# Patient Record
Sex: MALE | Race: WHITE | Employment: OTHER | URBAN - METROPOLITAN AREA
[De-identification: names, ages, dates, MRNs, and addresses within clinical notes are randomized per-mention and may not be internally consistent; named-entity substitution may affect disease eponyms.]

---

## 2017-01-10 ENCOUNTER — NURSE ONLY (OUTPATIENT)
Dept: CARDIOLOGY CLINIC | Age: 82
End: 2017-01-10

## 2017-01-10 ENCOUNTER — OFFICE VISIT (OUTPATIENT)
Dept: INTERNAL MEDICINE CLINIC | Age: 82
End: 2017-01-10

## 2017-01-10 VITALS
HEART RATE: 65 BPM | SYSTOLIC BLOOD PRESSURE: 128 MMHG | DIASTOLIC BLOOD PRESSURE: 70 MMHG | HEIGHT: 65 IN | WEIGHT: 208 LBS | BODY MASS INDEX: 34.66 KG/M2

## 2017-01-10 VITALS
WEIGHT: 207 LBS | HEART RATE: 65 BPM | OXYGEN SATURATION: 94 % | BODY MASS INDEX: 34.49 KG/M2 | HEIGHT: 65 IN | RESPIRATION RATE: 14 BRPM | DIASTOLIC BLOOD PRESSURE: 62 MMHG | SYSTOLIC BLOOD PRESSURE: 115 MMHG

## 2017-01-10 DIAGNOSIS — E11.51 TYPE 2 DIABETES MELLITUS WITH DIABETIC PERIPHERAL ANGIOPATHY WITHOUT GANGRENE, WITH LONG-TERM CURRENT USE OF INSULIN (HCC): ICD-10-CM

## 2017-01-10 DIAGNOSIS — I73.9 PVD (PERIPHERAL VASCULAR DISEASE) (HCC): ICD-10-CM

## 2017-01-10 DIAGNOSIS — Z95.0 CARDIAC PACEMAKER IN SITU: ICD-10-CM

## 2017-01-10 DIAGNOSIS — Z00.00 ROUTINE HEALTH MAINTENANCE: ICD-10-CM

## 2017-01-10 DIAGNOSIS — I10 ESSENTIAL HYPERTENSION: Primary | ICD-10-CM

## 2017-01-10 DIAGNOSIS — Z95.0 CARDIAC PACEMAKER IN SITU: Primary | ICD-10-CM

## 2017-01-10 DIAGNOSIS — E11.42 DIABETIC POLYNEUROPATHY ASSOCIATED WITH TYPE 2 DIABETES MELLITUS (HCC): ICD-10-CM

## 2017-01-10 DIAGNOSIS — I25.810 CORONARY ARTERY DISEASE INVOLVING CORONARY BYPASS GRAFT OF NATIVE HEART WITHOUT ANGINA PECTORIS: ICD-10-CM

## 2017-01-10 DIAGNOSIS — E78.00 PURE HYPERCHOLESTEROLEMIA: ICD-10-CM

## 2017-01-10 DIAGNOSIS — Z79.4 TYPE 2 DIABETES MELLITUS WITH DIABETIC PERIPHERAL ANGIOPATHY WITHOUT GANGRENE, WITH LONG-TERM CURRENT USE OF INSULIN (HCC): ICD-10-CM

## 2017-01-10 DIAGNOSIS — I48.0 PAF (PAROXYSMAL ATRIAL FIBRILLATION) (HCC): ICD-10-CM

## 2017-01-10 DIAGNOSIS — N40.0 BENIGN PROSTATIC HYPERPLASIA WITHOUT LOWER URINARY TRACT SYMPTOMS, UNSPECIFIED MORPHOLOGY: ICD-10-CM

## 2017-01-10 PROCEDURE — G8484 FLU IMMUNIZE NO ADMIN: HCPCS | Performed by: INTERNAL MEDICINE

## 2017-01-10 PROCEDURE — 99204 OFFICE O/P NEW MOD 45 MIN: CPT | Performed by: INTERNAL MEDICINE

## 2017-01-10 PROCEDURE — 4040F PNEUMOC VAC/ADMIN/RCVD: CPT | Performed by: INTERNAL MEDICINE

## 2017-01-10 PROCEDURE — 1036F TOBACCO NON-USER: CPT | Performed by: INTERNAL MEDICINE

## 2017-01-10 PROCEDURE — 1123F ACP DISCUSS/DSCN MKR DOCD: CPT | Performed by: INTERNAL MEDICINE

## 2017-01-10 PROCEDURE — G8419 CALC BMI OUT NRM PARAM NOF/U: HCPCS | Performed by: INTERNAL MEDICINE

## 2017-01-10 PROCEDURE — G8598 ASA/ANTIPLAT THER USED: HCPCS | Performed by: INTERNAL MEDICINE

## 2017-01-10 PROCEDURE — 93280 PM DEVICE PROGR EVAL DUAL: CPT | Performed by: INTERNAL MEDICINE

## 2017-01-10 PROCEDURE — G8427 DOCREV CUR MEDS BY ELIG CLIN: HCPCS | Performed by: INTERNAL MEDICINE

## 2017-01-10 RX ORDER — GABAPENTIN 300 MG/1
300 CAPSULE ORAL 3 TIMES DAILY
COMMUNITY
End: 2017-01-10 | Stop reason: SDUPTHER

## 2017-01-10 RX ORDER — LISINOPRIL 5 MG/1
5 TABLET ORAL DAILY
Qty: 90 TABLET | Refills: 2 | Status: CANCELLED | OUTPATIENT
Start: 2017-01-10

## 2017-01-11 PROBLEM — N40.0 BENIGN PROSTATIC HYPERPLASIA WITHOUT LOWER URINARY TRACT SYMPTOMS: Status: ACTIVE | Noted: 2017-01-11

## 2017-01-11 PROBLEM — I25.810 CORONARY ARTERY DISEASE INVOLVING CORONARY BYPASS GRAFT OF NATIVE HEART WITHOUT ANGINA PECTORIS: Status: ACTIVE | Noted: 2017-01-11

## 2017-01-11 RX ORDER — SIMVASTATIN 20 MG
20 TABLET ORAL NIGHTLY
Qty: 90 TABLET | Refills: 2 | Status: SHIPPED | OUTPATIENT
Start: 2017-01-11

## 2017-01-11 RX ORDER — CHOLECALCIFEROL (VITAMIN D3) 25 MCG
2000 CAPSULE ORAL DAILY
Qty: 180 CAPSULE | Refills: 2 | Status: SHIPPED | OUTPATIENT
Start: 2017-01-11

## 2017-01-11 RX ORDER — GABAPENTIN 300 MG/1
300 CAPSULE ORAL 3 TIMES DAILY
Qty: 270 CAPSULE | Refills: 2 | Status: SHIPPED | OUTPATIENT
Start: 2017-01-11 | End: 2017-02-02 | Stop reason: DRUGHIGH

## 2017-01-11 RX ORDER — CILOSTAZOL 100 MG/1
100 TABLET ORAL 2 TIMES DAILY
Qty: 180 TABLET | Refills: 2 | Status: SHIPPED | OUTPATIENT
Start: 2017-01-11

## 2017-01-11 RX ORDER — ACETAMINOPHEN 325 MG/1
650 TABLET ORAL EVERY 6 HOURS PRN
Qty: 120 TABLET | Refills: 3 | Status: SHIPPED | OUTPATIENT
Start: 2017-01-11 | End: 2017-02-04

## 2017-01-11 RX ORDER — CHLORAL HYDRATE 500 MG
1000 CAPSULE ORAL EVERY MORNING
Qty: 90 CAPSULE | Refills: 3 | Status: SHIPPED | OUTPATIENT
Start: 2017-01-11 | End: 2018-02-07 | Stop reason: ALTCHOICE

## 2017-01-11 RX ORDER — METOPROLOL TARTRATE 50 MG/1
50 TABLET, FILM COATED ORAL 2 TIMES DAILY
Qty: 180 TABLET | Refills: 2 | Status: SHIPPED | OUTPATIENT
Start: 2017-01-11 | End: 2017-11-02

## 2017-01-11 RX ORDER — LISINOPRIL 10 MG/1
10 TABLET ORAL DAILY
Qty: 90 TABLET | Refills: 2 | Status: SHIPPED | OUTPATIENT
Start: 2017-01-11 | End: 2017-04-12 | Stop reason: DRUGHIGH

## 2017-01-11 RX ORDER — ASPIRIN 81 MG/1
81 TABLET ORAL EVERY MORNING
Qty: 90 TABLET | Refills: 2 | Status: SHIPPED | OUTPATIENT
Start: 2017-01-11 | End: 2019-02-01

## 2017-01-11 RX ORDER — GLIPIZIDE 5 MG/1
10 TABLET ORAL
Qty: 180 TABLET | Refills: 2 | Status: SHIPPED | OUTPATIENT
Start: 2017-01-11 | End: 2017-05-10 | Stop reason: SDUPTHER

## 2017-01-11 RX ORDER — INSULIN GLARGINE 100 [IU]/ML
20 INJECTION, SOLUTION SUBCUTANEOUS NIGHTLY
Qty: 10 VIAL | Refills: 2 | Status: SHIPPED | OUTPATIENT
Start: 2017-01-11

## 2017-01-11 RX ORDER — TAMSULOSIN HYDROCHLORIDE 0.4 MG/1
0.4 CAPSULE ORAL DAILY
Qty: 90 CAPSULE | Refills: 2 | Status: SHIPPED | OUTPATIENT
Start: 2017-01-11

## 2017-02-02 ENCOUNTER — OFFICE VISIT (OUTPATIENT)
Dept: INTERNAL MEDICINE CLINIC | Age: 82
End: 2017-02-02

## 2017-02-02 VITALS
OXYGEN SATURATION: 96 % | HEIGHT: 64 IN | SYSTOLIC BLOOD PRESSURE: 130 MMHG | RESPIRATION RATE: 12 BRPM | BODY MASS INDEX: 34.15 KG/M2 | WEIGHT: 200 LBS | HEART RATE: 60 BPM | DIASTOLIC BLOOD PRESSURE: 60 MMHG

## 2017-02-02 DIAGNOSIS — E78.00 PURE HYPERCHOLESTEROLEMIA: ICD-10-CM

## 2017-02-02 DIAGNOSIS — I10 ESSENTIAL HYPERTENSION: ICD-10-CM

## 2017-02-02 DIAGNOSIS — Z95.0 CARDIAC PACEMAKER IN SITU: ICD-10-CM

## 2017-02-02 DIAGNOSIS — Z79.4 TYPE 2 DIABETES MELLITUS WITH DIABETIC PERIPHERAL ANGIOPATHY WITHOUT GANGRENE, WITH LONG-TERM CURRENT USE OF INSULIN (HCC): ICD-10-CM

## 2017-02-02 DIAGNOSIS — I25.810 CORONARY ARTERY DISEASE INVOLVING CORONARY BYPASS GRAFT OF NATIVE HEART WITHOUT ANGINA PECTORIS: ICD-10-CM

## 2017-02-02 DIAGNOSIS — R42 DIZZINESS: Primary | ICD-10-CM

## 2017-02-02 DIAGNOSIS — E11.51 TYPE 2 DIABETES MELLITUS WITH DIABETIC PERIPHERAL ANGIOPATHY WITHOUT GANGRENE, WITH LONG-TERM CURRENT USE OF INSULIN (HCC): ICD-10-CM

## 2017-02-02 DIAGNOSIS — E11.42 DIABETIC POLYNEUROPATHY ASSOCIATED WITH TYPE 2 DIABETES MELLITUS (HCC): ICD-10-CM

## 2017-02-02 PROCEDURE — 99214 OFFICE O/P EST MOD 30 MIN: CPT | Performed by: INTERNAL MEDICINE

## 2017-02-02 RX ORDER — GABAPENTIN 300 MG/1
300 CAPSULE ORAL NIGHTLY
Qty: 270 CAPSULE | Refills: 2 | Status: SHIPPED | OUTPATIENT
Start: 2017-02-02

## 2017-02-06 ENCOUNTER — TELEPHONE (OUTPATIENT)
Dept: INTERNAL MEDICINE CLINIC | Age: 82
End: 2017-02-06

## 2017-02-06 DIAGNOSIS — H81.10 BPV (BENIGN POSITIONAL VERTIGO), UNSPECIFIED LATERALITY: Primary | ICD-10-CM

## 2017-03-02 ENCOUNTER — TELEPHONE (OUTPATIENT)
Dept: INTERNAL MEDICINE CLINIC | Age: 82
End: 2017-03-02

## 2017-03-08 RX ORDER — PEN NEEDLE, DIABETIC 30 GX5/16"
1 NEEDLE, DISPOSABLE MISCELLANEOUS DAILY
Qty: 100 EACH | Refills: 3 | Status: SHIPPED | OUTPATIENT
Start: 2017-03-08

## 2017-03-08 RX ORDER — PEN NEEDLE, DIABETIC 30 GX5/16"
1 NEEDLE, DISPOSABLE MISCELLANEOUS DAILY
COMMUNITY
End: 2017-03-08 | Stop reason: SDUPTHER

## 2017-04-12 ENCOUNTER — OFFICE VISIT (OUTPATIENT)
Dept: CARDIOLOGY CLINIC | Age: 82
End: 2017-04-12

## 2017-04-12 ENCOUNTER — PROCEDURE VISIT (OUTPATIENT)
Dept: CARDIOLOGY CLINIC | Age: 82
End: 2017-04-12

## 2017-04-12 VITALS
WEIGHT: 197 LBS | DIASTOLIC BLOOD PRESSURE: 70 MMHG | HEIGHT: 66 IN | BODY MASS INDEX: 31.66 KG/M2 | HEART RATE: 60 BPM | SYSTOLIC BLOOD PRESSURE: 110 MMHG

## 2017-04-12 VITALS — HEART RATE: 68 BPM | SYSTOLIC BLOOD PRESSURE: 110 MMHG | DIASTOLIC BLOOD PRESSURE: 70 MMHG

## 2017-04-12 DIAGNOSIS — Z95.0 S/P PLACEMENT OF CARDIAC PACEMAKER: ICD-10-CM

## 2017-04-12 DIAGNOSIS — I25.810 CORONARY ARTERY DISEASE INVOLVING CORONARY BYPASS GRAFT OF NATIVE HEART WITHOUT ANGINA PECTORIS: Primary | ICD-10-CM

## 2017-04-12 DIAGNOSIS — Z95.1 S/P CABG X 3: ICD-10-CM

## 2017-04-12 DIAGNOSIS — Z95.0 CARDIAC PACEMAKER IN SITU: Primary | ICD-10-CM

## 2017-04-12 DIAGNOSIS — I73.9 PAD (PERIPHERAL ARTERY DISEASE) (HCC): ICD-10-CM

## 2017-04-12 DIAGNOSIS — Z95.0 PACEMAKER: ICD-10-CM

## 2017-04-12 DIAGNOSIS — Z79.4 TYPE 2 DIABETES MELLITUS WITH DIABETIC PERIPHERAL ANGIOPATHY WITHOUT GANGRENE, WITH LONG-TERM CURRENT USE OF INSULIN (HCC): ICD-10-CM

## 2017-04-12 DIAGNOSIS — E78.00 PURE HYPERCHOLESTEROLEMIA: ICD-10-CM

## 2017-04-12 DIAGNOSIS — E11.51 TYPE 2 DIABETES MELLITUS WITH DIABETIC PERIPHERAL ANGIOPATHY WITHOUT GANGRENE, WITH LONG-TERM CURRENT USE OF INSULIN (HCC): ICD-10-CM

## 2017-04-12 DIAGNOSIS — I48.0 PAF (PAROXYSMAL ATRIAL FIBRILLATION) (HCC): ICD-10-CM

## 2017-04-12 DIAGNOSIS — Z13.1 DM (DIABETES MELLITUS SCREEN): ICD-10-CM

## 2017-04-12 DIAGNOSIS — I10 ESSENTIAL HYPERTENSION: ICD-10-CM

## 2017-04-12 PROCEDURE — G8598 ASA/ANTIPLAT THER USED: HCPCS | Performed by: INTERNAL MEDICINE

## 2017-04-12 PROCEDURE — 1123F ACP DISCUSS/DSCN MKR DOCD: CPT | Performed by: INTERNAL MEDICINE

## 2017-04-12 PROCEDURE — 99214 OFFICE O/P EST MOD 30 MIN: CPT | Performed by: INTERNAL MEDICINE

## 2017-04-12 PROCEDURE — 93280 PM DEVICE PROGR EVAL DUAL: CPT | Performed by: INTERNAL MEDICINE

## 2017-04-12 PROCEDURE — 1036F TOBACCO NON-USER: CPT | Performed by: INTERNAL MEDICINE

## 2017-04-12 PROCEDURE — G8419 CALC BMI OUT NRM PARAM NOF/U: HCPCS | Performed by: INTERNAL MEDICINE

## 2017-04-12 PROCEDURE — 4040F PNEUMOC VAC/ADMIN/RCVD: CPT | Performed by: INTERNAL MEDICINE

## 2017-04-12 PROCEDURE — G8427 DOCREV CUR MEDS BY ELIG CLIN: HCPCS | Performed by: INTERNAL MEDICINE

## 2017-04-12 RX ORDER — LISINOPRIL 5 MG/1
5 TABLET ORAL DAILY
COMMUNITY
End: 2017-05-10 | Stop reason: SDUPTHER

## 2017-05-10 DIAGNOSIS — E11.51 TYPE 2 DIABETES MELLITUS WITH DIABETIC PERIPHERAL ANGIOPATHY WITHOUT GANGRENE, WITH LONG-TERM CURRENT USE OF INSULIN (HCC): ICD-10-CM

## 2017-05-10 DIAGNOSIS — Z79.4 TYPE 2 DIABETES MELLITUS WITH DIABETIC PERIPHERAL ANGIOPATHY WITHOUT GANGRENE, WITH LONG-TERM CURRENT USE OF INSULIN (HCC): ICD-10-CM

## 2017-05-11 ENCOUNTER — TELEPHONE (OUTPATIENT)
Dept: INTERNAL MEDICINE CLINIC | Age: 82
End: 2017-05-11

## 2017-05-12 RX ORDER — LISINOPRIL 5 MG/1
5 TABLET ORAL DAILY
Qty: 30 TABLET | Refills: 3 | Status: SHIPPED | OUTPATIENT
Start: 2017-05-12 | End: 2017-05-16 | Stop reason: SDUPTHER

## 2017-05-12 RX ORDER — GLIPIZIDE 5 MG/1
10 TABLET ORAL
Qty: 180 TABLET | Refills: 2 | Status: SHIPPED | OUTPATIENT
Start: 2017-05-12 | End: 2017-05-17 | Stop reason: SDUPTHER

## 2017-05-16 DIAGNOSIS — I10 ESSENTIAL HYPERTENSION: Primary | ICD-10-CM

## 2017-05-16 RX ORDER — LISINOPRIL 5 MG/1
5 TABLET ORAL DAILY
Qty: 90 TABLET | Refills: 1 | Status: ON HOLD | OUTPATIENT
Start: 2017-05-16 | End: 2019-12-31 | Stop reason: HOSPADM

## 2017-05-17 DIAGNOSIS — Z79.4 TYPE 2 DIABETES MELLITUS WITH DIABETIC PERIPHERAL ANGIOPATHY WITHOUT GANGRENE, WITH LONG-TERM CURRENT USE OF INSULIN (HCC): ICD-10-CM

## 2017-05-17 DIAGNOSIS — E11.51 TYPE 2 DIABETES MELLITUS WITH DIABETIC PERIPHERAL ANGIOPATHY WITHOUT GANGRENE, WITH LONG-TERM CURRENT USE OF INSULIN (HCC): ICD-10-CM

## 2017-05-17 RX ORDER — GLIPIZIDE 5 MG/1
10 TABLET ORAL
Qty: 360 TABLET | Refills: 1 | Status: SHIPPED | OUTPATIENT
Start: 2017-05-17

## 2017-07-19 ENCOUNTER — NURSE ONLY (OUTPATIENT)
Dept: CARDIOLOGY CLINIC | Age: 82
End: 2017-07-19

## 2017-07-19 VITALS — DIASTOLIC BLOOD PRESSURE: 68 MMHG | HEART RATE: 60 BPM | SYSTOLIC BLOOD PRESSURE: 118 MMHG

## 2017-07-19 DIAGNOSIS — Z95.0 CARDIAC PACEMAKER IN SITU: Primary | ICD-10-CM

## 2017-07-19 PROCEDURE — 93280 PM DEVICE PROGR EVAL DUAL: CPT | Performed by: INTERNAL MEDICINE

## 2017-10-25 ENCOUNTER — PROCEDURE VISIT (OUTPATIENT)
Dept: CARDIOLOGY CLINIC | Age: 82
End: 2017-10-25

## 2017-10-25 ENCOUNTER — OFFICE VISIT (OUTPATIENT)
Dept: CARDIOLOGY CLINIC | Age: 82
End: 2017-10-25

## 2017-10-25 VITALS — HEART RATE: 64 BPM | DIASTOLIC BLOOD PRESSURE: 60 MMHG | SYSTOLIC BLOOD PRESSURE: 92 MMHG

## 2017-10-25 VITALS
HEART RATE: 64 BPM | WEIGHT: 204.6 LBS | SYSTOLIC BLOOD PRESSURE: 92 MMHG | DIASTOLIC BLOOD PRESSURE: 60 MMHG | BODY MASS INDEX: 32.88 KG/M2 | HEIGHT: 66 IN

## 2017-10-25 DIAGNOSIS — Z79.4 TYPE 2 DIABETES MELLITUS WITH DIABETIC PERIPHERAL ANGIOPATHY WITHOUT GANGRENE, WITH LONG-TERM CURRENT USE OF INSULIN (HCC): ICD-10-CM

## 2017-10-25 DIAGNOSIS — Z95.0 CARDIAC PACEMAKER IN SITU: Primary | ICD-10-CM

## 2017-10-25 DIAGNOSIS — I25.810 CORONARY ARTERY DISEASE INVOLVING CORONARY BYPASS GRAFT OF NATIVE HEART WITHOUT ANGINA PECTORIS: Primary | ICD-10-CM

## 2017-10-25 DIAGNOSIS — E11.51 TYPE 2 DIABETES MELLITUS WITH DIABETIC PERIPHERAL ANGIOPATHY WITHOUT GANGRENE, WITH LONG-TERM CURRENT USE OF INSULIN (HCC): ICD-10-CM

## 2017-10-25 DIAGNOSIS — Z95.0 S/P PLACEMENT OF CARDIAC PACEMAKER: ICD-10-CM

## 2017-10-25 DIAGNOSIS — I73.9 PAD (PERIPHERAL ARTERY DISEASE) (HCC): ICD-10-CM

## 2017-10-25 DIAGNOSIS — R42 DIZZINESS: ICD-10-CM

## 2017-10-25 DIAGNOSIS — E78.00 PURE HYPERCHOLESTEROLEMIA: ICD-10-CM

## 2017-10-25 DIAGNOSIS — Z95.1 S/P CABG X 3: ICD-10-CM

## 2017-10-25 DIAGNOSIS — I48.0 PAF (PAROXYSMAL ATRIAL FIBRILLATION) (HCC): ICD-10-CM

## 2017-10-25 DIAGNOSIS — I10 ESSENTIAL HYPERTENSION: ICD-10-CM

## 2017-10-25 PROCEDURE — 1036F TOBACCO NON-USER: CPT | Performed by: INTERNAL MEDICINE

## 2017-10-25 PROCEDURE — 4040F PNEUMOC VAC/ADMIN/RCVD: CPT | Performed by: INTERNAL MEDICINE

## 2017-10-25 PROCEDURE — G8484 FLU IMMUNIZE NO ADMIN: HCPCS | Performed by: INTERNAL MEDICINE

## 2017-10-25 PROCEDURE — 1123F ACP DISCUSS/DSCN MKR DOCD: CPT | Performed by: INTERNAL MEDICINE

## 2017-10-25 PROCEDURE — G8427 DOCREV CUR MEDS BY ELIG CLIN: HCPCS | Performed by: INTERNAL MEDICINE

## 2017-10-25 PROCEDURE — 93280 PM DEVICE PROGR EVAL DUAL: CPT | Performed by: INTERNAL MEDICINE

## 2017-10-25 PROCEDURE — G8417 CALC BMI ABV UP PARAM F/U: HCPCS | Performed by: INTERNAL MEDICINE

## 2017-10-25 PROCEDURE — 99214 OFFICE O/P EST MOD 30 MIN: CPT | Performed by: INTERNAL MEDICINE

## 2017-10-25 PROCEDURE — G8598 ASA/ANTIPLAT THER USED: HCPCS | Performed by: INTERNAL MEDICINE

## 2017-10-25 RX ORDER — SULFAMETHOXAZOLE AND TRIMETHOPRIM 800; 160 MG/1; MG/1
TABLET ORAL
COMMUNITY
Start: 2017-07-30 | End: 2018-07-01

## 2017-10-25 NOTE — PATIENT INSTRUCTIONS
CAD:Yes   clinically stable. Patient is on optimal medical regimen ( see medication list above )  -     CORONARY ARTERY DISEASE: Patient is currently  asymptomatic from CAD. - changes in  treatment:   no           - Testing ordered:  no  [facility] classification: 1  [place] RISK SCORE:  [place] RISK SCORE:  HTN:well controlled on current medical regimen, see list above. - changes in  treatment:   no      VHD: No significant VHD noted  DYSLIPIDEMIA: Patient's profile is at / near Goal.yes,                                 HDL is low                                Tolerating current medical regimen wellyes,                                                          See most recent Lab values in Labs section above. Diabetes mellitis: .yes,                                      Managed by family MD                                     BS under good control no                                     Hgb A1c avilable yes,   PAD: None known. claudication symptoms. .no  OTHER RELEVANT DIAGNOSIS:as noted in patient's active problem list:  TESTS ORDERED:  Echocardiogram & Lexiscan Cardiolite. All previously ordered tests reviewed. ARRHYTHMIAS: Patient has H/O P. Atrial fibrillation                                He is rate controlled & on anticoagulation. MEDICATIONS: CPM   Office f/u in six months. Primary/secondary prevention is the goal by aggressive risk modification, healthy and therapeutic life style changes for cardiovascular risk reduction. Various goals are discussed and multiple questions answered.

## 2017-10-25 NOTE — PROGRESS NOTES
MISC 1 each by Does not apply route daily 100 each 3    gabapentin (NEURONTIN) 300 MG capsule Take 1 capsule by mouth nightly 270 capsule 2    apixaban (ELIQUIS) 2.5 MG TABS tablet Take 1 tablet by mouth 2 times daily (Patient taking differently: Take 5 mg by mouth 2 times daily ) 180 tablet 0    cilostazol (PLETAL) 100 MG tablet Take 1 tablet by mouth 2 times daily 180 tablet 2    insulin glargine (LANTUS) 100 UNIT/ML injection vial Inject 20 Units into the skin nightly 10 vial 2    saxagliptin (ONGLYZA) 2.5 MG TABS tablet Take 2 tablets by mouth daily 180 tablet 2    metFORMIN (GLUCOPHAGE) 1000 MG tablet Take 0.5 tablets by mouth daily (with breakfast) 180 tablet 2    tamsulosin (FLOMAX) 0.4 MG capsule Take 1 capsule by mouth daily 90 capsule 2    Cholecalciferol (VITAMIN D-3) 1000 UNITS CAPS Take 2,000 Units by mouth daily 180 capsule 2    metoprolol tartrate (LOPRESSOR) 50 MG tablet Take 1 tablet by mouth 2 times daily 180 tablet 2    Omega-3 Fatty Acids (FISH OIL) 1000 MG CAPS Take 1 capsule by mouth every morning Over The Counter 90 capsule 3    simvastatin (ZOCOR) 20 MG tablet Take 1 tablet by mouth nightly 90 tablet 2    aspirin EC 81 MG EC tablet Take 1 tablet by mouth every morning Over The Counter, Last Dose Taken 12-13-13 Due To To Scheduled Surgery 90 tablet 2     No current facility-administered medications for this visit. Allergies: Pcn [penicillins];  Terramycin [oxytetracycline]; and Tetracyclines & related  Past Medical History:   Diagnosis Date    Acid reflux     Arthritis     Asthma     \"As A Child, No Problems\"    Back problem     \"Back Spasms Sometimes\"    CAD (coronary artery disease)     Sees Dr. Kaycee Howell    Diabetes mellitus [facility]) Dx 1980's    Full dentures     H/O 24 hour EKG monitoring 05/22/2014    14 DAY EVENT MONITOR    H/O Doppler ultrasound 10/25/11    <50% carotid disease bilaterally    H/O echocardiogram 10/11    EF 62%    H/O echocardiogram 11/24/14 Significant for aortic root and left arterial enlargement. Normal LV systolic but abnormal diastolic function. Mild tricuspid and aortic insufficiencies. Minimum pulmonary HTN.     Hiatal hernia     History of cardiovascular stress test     12/13 Normal EF52%, 10/11 moderate inferior wall myocardial ischemia    History of Holter monitoring 5/22/14    Event Monitor: Paced rhythm with PACs    HX OTHER MEDICAL     Primary Care Physician Is Dr. Patricia Neither     Resident At [address] [place] [address]     Hypertension     Kidney stones 1980's    \"Passed Kidney Stones\"    Neuropathy ([address]     \"Both Feet\"    Nocturia     [facility]     Palpitations 5/14    PVD (peripheral vascular disease) ([facility] [address] 2/11    mild    S/P CABG x 3 1998    Done [place] S/P left heart catheterization by percutaneous approach 6/28/10    patency of 2 grafts with possile occlusion of third    S/P placement of cardiac pacemaker 8-7-98, 1-7-11    Nirav Persaud DR Pacemaker Implanted    Slow urinary stream     Staph infection \"Twice In 2001\"    \"Left Knee After Surgery\"    Wears glasses      Past Surgical History:   Procedure Laterality Date    CARDIAC PACEMAKER PLACEMENT  8-7-98    Nirav Persaud DR Pacemaker Implanted    CARDIAC PACEMAKER PLACEMENT  1-7-11    Nirav Persaud DR Pacemaker Implanted   [address]    CABG ([address])    CHOLECYSTECTOMY, LAPAROSCOPIC  1999    COLONOSCOPY  \"Several\"    \"Removed Polyps Once\"    DENTAL SURGERY      All Teeth Extracted In Past    INGUINAL HERNIA REPAIR Left 1940   Lonnie Sergeant Right 1980's    JOINT REPLACEMENT Left 2001    Total Left Knee    JOINT REPLACEMENT Left 2001    \"Had Staph Infection , Took Implant Out, Replaced It After 8 Months\"    OTHER SURGICAL HISTORY Left 12/18/13    left spermatocelectomy, left scrotal exploration    PACEMAKER PLACEMENT  8-7-98    Nirav Persaud DR No jaundice, no conjunctival pallor. SKIN: It is warm & dry. No rashes. No Echhymosis    HEENT  No clinically significant abnormalities seen. Neck - Supple. No jugular venous distention noted. No carotid bruits. Cardiovascular  Normal S1 and S2 without obvious murmur or gallop. Extremities - No cyanosis, clubbing, or significant edema. Pulmonary  No respiratory distress. No wheezes or rales. Abdomen  No masses, tenderness, or organomegaly. Musculoskeletal  No significant edema. No joint deformities. No muscle wasting. Neurologic  Cranial nerves II through XII are grossly intact. There were no gross focal neurologic abnormalities. Lab Review   Lab Results   Component Value Date    TROPONINI 0.016 05/16/2014     BNP:    Lab Results   Component Value Date    BNP 28 05/16/2014     PT/INR:    Lab Results   Component Value Date    INR 1.18 01/31/2017     Lab Results   Component Value Date    LABA1C 7.5 (H) 01/31/2017    LABA1C 7.3 (H) 10/07/2016     Lab Results   Component Value Date    WBC 4.5 02/01/2017    HCT 33.0 (L) 02/01/2017    MCV 99.4 02/01/2017     (L) 02/01/2017     Lab Results   Component Value Date    CHOL 105 10/07/2016    TRIG 66 10/07/2016    HDL 49 10/07/2016    LDLCALC 43 10/07/2016    LDLDIRECT 49 07/12/2016     Lab Results   Component Value Date    ALT 10 01/31/2017    AST 16 01/31/2017     BMP:    Lab Results   Component Value Date     02/01/2017    K 4.1 02/01/2017     02/01/2017    CO2 28 02/01/2017    BUN 25 02/01/2017    CREATININE 1.2 02/01/2017     CMP:   Lab Results   Component Value Date     02/01/2017    K 4.1 02/01/2017     02/01/2017    CO2 28 02/01/2017    BUN 25 02/01/2017    PROT 6.1 01/31/2017    PROT 6.1 11/13/2012     TSH:  No results found for: TSH    QUALITY MEASURES REVIEWED:  1.CAD:Patient is taking anti platelet agent:Yes  2. DYSLIPIDEMIA: Patient is on cholesterol lowering medication:Yes  3. Beta-Blocker therapy for CAD, if

## 2017-11-01 ENCOUNTER — PROCEDURE VISIT (OUTPATIENT)
Dept: CARDIOLOGY CLINIC | Age: 82
End: 2017-11-01

## 2017-11-01 DIAGNOSIS — I10 ESSENTIAL HYPERTENSION: ICD-10-CM

## 2017-11-01 DIAGNOSIS — R42 DIZZINESS: ICD-10-CM

## 2017-11-01 DIAGNOSIS — Z95.0 S/P PLACEMENT OF CARDIAC PACEMAKER: ICD-10-CM

## 2017-11-01 DIAGNOSIS — E78.00 PURE HYPERCHOLESTEROLEMIA: ICD-10-CM

## 2017-11-01 DIAGNOSIS — I25.810 CORONARY ARTERY DISEASE INVOLVING CORONARY BYPASS GRAFT OF NATIVE HEART WITHOUT ANGINA PECTORIS: ICD-10-CM

## 2017-11-01 DIAGNOSIS — Z79.4 TYPE 2 DIABETES MELLITUS WITH DIABETIC PERIPHERAL ANGIOPATHY WITHOUT GANGRENE, WITH LONG-TERM CURRENT USE OF INSULIN (HCC): ICD-10-CM

## 2017-11-01 DIAGNOSIS — I73.9 PAD (PERIPHERAL ARTERY DISEASE) (HCC): ICD-10-CM

## 2017-11-01 DIAGNOSIS — E11.51 TYPE 2 DIABETES MELLITUS WITH DIABETIC PERIPHERAL ANGIOPATHY WITHOUT GANGRENE, WITH LONG-TERM CURRENT USE OF INSULIN (HCC): ICD-10-CM

## 2017-11-01 DIAGNOSIS — Z95.1 S/P CABG X 3: ICD-10-CM

## 2017-11-01 DIAGNOSIS — I48.0 PAF (PAROXYSMAL ATRIAL FIBRILLATION) (HCC): ICD-10-CM

## 2017-11-01 DIAGNOSIS — I25.810 CORONARY ARTERY DISEASE INVOLVING CORONARY BYPASS GRAFT OF NATIVE HEART WITHOUT ANGINA PECTORIS: Primary | ICD-10-CM

## 2017-11-01 LAB
LV EF: 58 %
LV EF: 65 %
LVEF MODALITY: NORMAL
LVEF MODALITY: NORMAL

## 2017-11-01 PROCEDURE — 93016 CV STRESS TEST SUPVJ ONLY: CPT | Performed by: INTERNAL MEDICINE

## 2017-11-01 PROCEDURE — A9500 TC99M SESTAMIBI: HCPCS | Performed by: INTERNAL MEDICINE

## 2017-11-01 PROCEDURE — 93017 CV STRESS TEST TRACING ONLY: CPT | Performed by: INTERNAL MEDICINE

## 2017-11-01 PROCEDURE — 93018 CV STRESS TEST I&R ONLY: CPT | Performed by: INTERNAL MEDICINE

## 2017-11-01 PROCEDURE — 93306 TTE W/DOPPLER COMPLETE: CPT | Performed by: INTERNAL MEDICINE

## 2017-11-01 PROCEDURE — 78452 HT MUSCLE IMAGE SPECT MULT: CPT | Performed by: INTERNAL MEDICINE

## 2017-11-02 ENCOUNTER — OFFICE VISIT (OUTPATIENT)
Dept: CARDIOLOGY CLINIC | Age: 82
End: 2017-11-02

## 2017-11-02 ENCOUNTER — TELEPHONE (OUTPATIENT)
Dept: CARDIOLOGY CLINIC | Age: 82
End: 2017-11-02

## 2017-11-02 VITALS
WEIGHT: 200.2 LBS | DIASTOLIC BLOOD PRESSURE: 60 MMHG | BODY MASS INDEX: 32.17 KG/M2 | HEIGHT: 66 IN | SYSTOLIC BLOOD PRESSURE: 100 MMHG | HEART RATE: 88 BPM

## 2017-11-02 DIAGNOSIS — R00.2 PALPITATIONS: ICD-10-CM

## 2017-11-02 DIAGNOSIS — I73.9 PAD (PERIPHERAL ARTERY DISEASE) (HCC): ICD-10-CM

## 2017-11-02 DIAGNOSIS — E78.00 PURE HYPERCHOLESTEROLEMIA: ICD-10-CM

## 2017-11-02 DIAGNOSIS — Z95.0 S/P PLACEMENT OF CARDIAC PACEMAKER: ICD-10-CM

## 2017-11-02 DIAGNOSIS — I25.810 CORONARY ARTERY DISEASE INVOLVING CORONARY BYPASS GRAFT OF NATIVE HEART WITHOUT ANGINA PECTORIS: Primary | ICD-10-CM

## 2017-11-02 DIAGNOSIS — I10 ESSENTIAL HYPERTENSION: ICD-10-CM

## 2017-11-02 DIAGNOSIS — Z95.1 S/P CABG X 3: ICD-10-CM

## 2017-11-02 DIAGNOSIS — Z79.4 TYPE 2 DIABETES MELLITUS WITH DIABETIC PERIPHERAL ANGIOPATHY WITHOUT GANGRENE, WITH LONG-TERM CURRENT USE OF INSULIN (HCC): ICD-10-CM

## 2017-11-02 DIAGNOSIS — E11.51 TYPE 2 DIABETES MELLITUS WITH DIABETIC PERIPHERAL ANGIOPATHY WITHOUT GANGRENE, WITH LONG-TERM CURRENT USE OF INSULIN (HCC): ICD-10-CM

## 2017-11-02 DIAGNOSIS — I48.0 PAF (PAROXYSMAL ATRIAL FIBRILLATION) (HCC): ICD-10-CM

## 2017-11-02 PROCEDURE — 4040F PNEUMOC VAC/ADMIN/RCVD: CPT | Performed by: INTERNAL MEDICINE

## 2017-11-02 PROCEDURE — G8427 DOCREV CUR MEDS BY ELIG CLIN: HCPCS | Performed by: INTERNAL MEDICINE

## 2017-11-02 PROCEDURE — G8598 ASA/ANTIPLAT THER USED: HCPCS | Performed by: INTERNAL MEDICINE

## 2017-11-02 PROCEDURE — G8417 CALC BMI ABV UP PARAM F/U: HCPCS | Performed by: INTERNAL MEDICINE

## 2017-11-02 PROCEDURE — 99214 OFFICE O/P EST MOD 30 MIN: CPT | Performed by: INTERNAL MEDICINE

## 2017-11-02 PROCEDURE — 1123F ACP DISCUSS/DSCN MKR DOCD: CPT | Performed by: INTERNAL MEDICINE

## 2017-11-02 PROCEDURE — 1036F TOBACCO NON-USER: CPT | Performed by: INTERNAL MEDICINE

## 2017-11-02 PROCEDURE — G8484 FLU IMMUNIZE NO ADMIN: HCPCS | Performed by: INTERNAL MEDICINE

## 2017-11-02 NOTE — PROGRESS NOTES
[id number] Score for Atrial Fibrillation Stroke Risk   Risk   Factors  Component Value   C CHF No 0   H HTN Yes 1   A2 Age >= 76 Yes,  (80 y.o.) 2   D DM Yes 1   S2 Prior Stroke/TIA No 0   V Vascular Disease Yes 1   A Age 74-69 No,  (80 y.o.) 0   Sc Sex male 0    [id number]  Score  5   Score last updated 11/0/94 0:29 PM    Click here for a link to the UpToDate guideline \"Atrial Fibrillation: Anticoagulation therapy to prevent embolization    Disclaimer: Risk Score calculation is dependent on accuracy of patient problem list and past encounter diagnosis.

## 2017-11-02 NOTE — PROGRESS NOTES
OFFICE PROGRESS NOTES      Bethany Medina is a 80 y.o. male who has    CHIEF COMPLAINT AS FOLLOWS:  CHEST PAIN: Patient denies any C/O chest pains at this time. SOB: No C/O SOB at this time. LEG EDEMA: No leg edema   PALPITATIONS: Denies any C/O Palpitations   DIZZINESS: No C/O Dizziness                          C/O positional Dizziness but no black out spells. SYNCOPE: None   OTHER:                                     HPI: Patient is here for F/U on his CAD, HTN & Dyslipidemia problems. He does not have any complaints at this time.     Tory Jones has the following history recorded in care path:  Patient Active Problem List    Diagnosis Date Noted    Dizziness      Priority: High    PAD (peripheral artery disease) ([facility] [address]      Priority: High    Coronary artery disease involving coronary bypass graft of native heart without angina pectoris 01/11/2017     Priority: Low    Benign prostatic hyperplasia without lower urinary tract symptoms 01/11/2017     Priority: Low    PAF (paroxysmal atrial fibrillation) (HCC)      Priority: Low    S/P placement of cardiac pacemaker      Priority: Low    Palpitations      Priority: Low    Other specified disorder of male genital organs(608.89) 12/18/2013     Priority: Low    Essential hypertension      Priority: Low    Type 2 diabetes mellitus with circulatory disorder, with long-term current use of insulin (HCC)      Priority: Low    Pure hypercholesterolemia      Priority: Low    S/P CABG x 3      Priority: Low     Current Outpatient Prescriptions   Medication Sig Dispense Refill    sulfamethoxazole-trimethoprim (BACTRIM DS;SEPTRA DS) 800-160 MG per tablet       glipiZIDE (GLUCOTROL) 5 MG tablet Take 2 tablets by mouth 2 times daily (before meals) 360 tablet 1    lisinopril (PRINIVIL;ZESTRIL) 5 MG tablet Take 1 tablet by mouth daily 90 tablet 1    Insulin Pen Needle (PEN NEEDLES 3/16\") 31G X 5 MM MISC 1 each by Does not apply route daily 100 each 3    gabapentin (NEURONTIN) 300 MG capsule Take 1 capsule by mouth nightly 270 capsule 2    apixaban (ELIQUIS) 2.5 MG TABS tablet Take 1 tablet by mouth 2 times daily (Patient taking differently: Take 5 mg by mouth 2 times daily ) 180 tablet 0    cilostazol (PLETAL) 100 MG tablet Take 1 tablet by mouth 2 times daily 180 tablet 2    insulin glargine (LANTUS) 100 UNIT/ML injection vial Inject 20 Units into the skin nightly 10 vial 2    saxagliptin (ONGLYZA) 2.5 MG TABS tablet Take 2 tablets by mouth daily 180 tablet 2    metFORMIN (GLUCOPHAGE) 1000 MG tablet Take 0.5 tablets by mouth daily (with breakfast) 180 tablet 2    tamsulosin (FLOMAX) 0.4 MG capsule Take 1 capsule by mouth daily 90 capsule 2    Cholecalciferol (VITAMIN D-3) 1000 UNITS CAPS Take 2,000 Units by mouth daily 180 capsule 2    metoprolol tartrate (LOPRESSOR) 50 MG tablet Take 1 tablet by mouth 2 times daily 180 tablet 2    Omega-3 Fatty Acids (FISH OIL) 1000 MG CAPS Take 1 capsule by mouth every morning Over The Counter 90 capsule 3    simvastatin (ZOCOR) 20 MG tablet Take 1 tablet by mouth nightly 90 tablet 2    aspirin EC 81 MG EC tablet Take 1 tablet by mouth every morning Over The Counter, Last Dose Taken 12-13-13 Due To To Scheduled Surgery 90 tablet 2     No current facility-administered medications for this visit. Allergies: Pcn [penicillins];  Terramycin [oxytetracycline]; and Tetracyclines & related  Past Medical History:   Diagnosis Date    Acid reflux     Arthritis     Asthma     \"As A Child, No Problems\"    Back problem     \"Back Spasms Sometimes\"    CAD (coronary artery disease)     Sees Dr. Jessi Patton    Diabetes mellitus [facility]) Dx 1980's    Full dentures     H/O 24 hour EKG monitoring 05/22/2014    14 DAY EVENT MONITOR    H/O Doppler ultrasound 10/25/11    <50% carotid disease bilaterally    H/O echocardiogram 10/11    EF 62%    H/O echocardiogram Teeth Extracted In Past    INGUINAL HERNIA REPAIR Left 1940    INGUINAL HERNIA REPAIR Right 1980's    JOINT REPLACEMENT Left 2001    Total Left Knee    JOINT REPLACEMENT Left 2001    \"Had Staph Infection , Took Implant Out, Replaced It After 8 Months\"    OTHER SURGICAL HISTORY Left 12/18/13    left spermatocelectomy, left scrotal exploration    PACEMAKER PLACEMENT  8-7-98    Nirav Persaud DR Pacemaker Implanted    PACEMAKER PLACEMENT  1-7-11    Nirav Persaud DR Pacemaker Implanted    TONSILLECTOMY  1930's      As reviewed   Family History   Problem Relation Age of Onset    Stroke Mother     Diabetes Mother     Cancer Mother      \"Breast Cancer\"    Stroke Father     High Blood Pressure Father     Arthritis Sister     Heart Disease Brother     Other Daughter      \"Grossly Overweight\"    Other Daughter      \"Sleep Apnea\"    Arthritis Son      Social History   Substance Use Topics    Smoking status: Former Smoker     Packs/day: 1.00     Years: 35.00     Start date: 12/16/1946     Quit date: 1/1/1981    Smokeless tobacco: Former User     Quit date: 12/16/1981      Comment: Reviewed 10/3/16    Alcohol use No      Review of Systems:    Constitutional: Negative for diaphoresis and fatigue  Psychological:Negative for anxiety or depression  HENT: Negative for headaches, nasal congestion, sinus pain or vertigo  Eyes: Negative for visual disturbance.    Endocrine: Negative for polydipsia/polyuria  Respiratory: Negative for shortness of breath  Cardiovascular: Negative for chest pain, dyspnea on exertion, claudication, edema, irregular heartbeat, murmur, palpitations or shortness of breath  Gastrointestinal: Negative for abdominal pain or heartburn  Genito-Urinary: Negative for urinary frequency/urgency  Musculoskeletal: Negative for muscle pain, muscular weakness, negative for pain in arm and leg or swelling in foot and leg  Neurological: Negative for dizziness, headaches, memory loss, numbness/tingling, visual changes, syncope  Dermatological: Negative for rash    Objective:  /60   Pulse 88   Ht 5' 6\" (1.676 m)   Wt 200 lb 3.2 oz (90.8 kg)   BMI 32.31 kg/m²   Wt Readings from Last 3 Encounters:   11/02/17 200 lb 3.2 oz (90.8 kg)   10/25/17 204 lb 9.6 oz (92.8 kg)   04/12/17 197 lb (89.4 kg)     Body mass index is 32.31 kg/m². GENERAL - Alert, oriented, pleasant, in no apparent distress. EYES: No jaundice, no conjunctival pallor. SKIN: It is warm & dry. No rashes. No Echhymosis    HEENT  No clinically significant abnormalities seen. Neck - Supple. No jugular venous distention noted. No carotid bruits. Cardiovascular  Normal S1 and S2 without obvious murmur or gallop. Extremities - No cyanosis, clubbing, or significant edema. Pulmonary  No respiratory distress. No wheezes or rales. Abdomen  No masses, tenderness, or organomegaly. Musculoskeletal  No significant edema. No joint deformities. No muscle wasting. Neurologic  Cranial nerves II through XII are grossly intact. There were no gross focal neurologic abnormalities.     Lab Review   Lab Results   Component Value Date    TROPONINI 0.016 05/16/2014     BNP:    Lab Results   Component Value Date    BNP 28 05/16/2014     PT/INR:    Lab Results   Component Value Date    INR 1.18 01/31/2017     Lab Results   Component Value Date    LABA1C 7.5 (H) 01/31/2017    LABA1C 7.3 (H) 10/07/2016     Lab Results   Component Value Date    WBC 4.5 02/01/2017    HCT 33.0 (L) 02/01/2017    MCV 99.4 02/01/2017     (L) 02/01/2017     Lab Results   Component Value Date    CHOL 105 10/07/2016    TRIG 66 10/07/2016    HDL 49 10/07/2016    LDLCALC 43 10/07/2016    LDLDIRECT 49 07/12/2016     Lab Results   Component Value Date    ALT 10 01/31/2017    AST 16 01/31/2017     BMP:    Lab Results   Component Value Date     02/01/2017    K 4.1 02/01/2017     02/01/2017    CO2 28 02/01/2017    BUN 25 02/01/2017    CREATININE DISEASE: Patient is currently  asymptomatic from CAD. - changes in  treatment:   no           - Testing ordered:  no  [facility] classification: 1  [place] RISK SCORE:  [place] RISK SCORE:  HTN:well controlled on current medical regimen, see list above. - changes in  treatment: no    VHD: No significant VHD noted  DYSLIPIDEMIA: Patient's profile is at / near [employer],                                                               Tolerating current medical regimen wellyes,                                                             See most recent Lab values in Labs section above. Diabetes mellitis: .yes,                                      Managed by family MD                                     BS under good control yes,                                      Hgb A1c avilable yes,   PAD:  known. claudication symptoms. .yes, activity is limited  OTHER RELEVANT DIAGNOSIS:as noted in patient's active problem list:  TESTS ORDERED: None this visit                                     All previously ordered tests reviewed. ARRHYTHMIAS: Patient has H/O Atrial fibrillation                                He is rate controlled & on anticoagulation. Patient is in NSR with the help of anti arrhythmics. MEDICATIONS: Reduce Metoprolol to 25 mg BID Office f/u in six months. Device check per protocol. Primary/secondary prevention is the goal by aggressive risk modification, healthy and therapeutic life style changes for cardiovascular risk reduction. Various goals are discussed and multiple questions answered.

## 2017-11-02 NOTE — TELEPHONE ENCOUNTER
Notified Pt of Echo and  stress  results. Verbalized understanding. Made appt @ 1340hr to discuss Stress          Probably normal perfusion study. Possibility of mild Inferior wall Ischemia cannot be excluded. The observed defect is probably due to diaphragmatic attenuation. Normal LV function & wall motion. LVEF is 65 %      Normal left ventricle structure and systolic function. Ejection fraction is visually estimated at 55-60%. Grade I diastolic dysfunction. Sclerotic, but non-stenotic aortic valve. Mild aortic regurgitation with pressure half time of 656 msec. Mildly elevated pulmonary artery pressure. Dilated aortic root at 4.2 cm. No evidence of pericardial effusion.

## 2017-11-02 NOTE — PATIENT INSTRUCTIONS
Please remember to bring all medication bottles or a medication list with you to your appointment. If you have any questions, please call our office at [phone]. CAD:Yes, Cardiolite is mildly abnormal. Suggest medical managemrent only. clinically stable. Patient is on optimal medical regimen ( see medication list above )  -     CORONARY ARTERY DISEASE: Patient is currently  asymptomatic from CAD. - changes in  treatment:   no           - Testing ordered:  no  [facility] classification: 1  [place] RISK SCORE:  [place] RISK SCORE:  HTN:well controlled on current medical regimen, see list above. - changes in  treatment: no    VHD: No significant VHD noted  DYSLIPIDEMIA: Patient's profile is at / near [employer],                                                               Tolerating current medical regimen wellyes,                                                             See most recent Lab values in Labs section above. Diabetes mellitis: .yes,                                      Managed by family MD                                     BS under good control yes,                                      Hgb A1c avilable yes,   PAD:  known. claudication symptoms. .yes, activity is limited  OTHER RELEVANT DIAGNOSIS:as noted in patient's active problem list:  TESTS ORDERED: None this visit                                     All previously ordered tests reviewed. ARRHYTHMIAS: Patient has H/O Atrial fibrillation                                He is rate controlled & on anticoagulation. Patient is in NSR with the help of anti arrhythmics. MEDICATIONS: Reduce Metoprolol to 25 mg BID Office f/u in six months. Device check per protocol. Primary/secondary prevention is the goal by aggressive risk modification, healthy and therapeutic life style changes for cardiovascular risk reduction.  Various goals are discussed and multiple questions answered.

## 2017-11-03 ENCOUNTER — TELEPHONE (OUTPATIENT)
Dept: CARDIOLOGY CLINIC | Age: 82
End: 2017-11-03

## 2018-01-31 ENCOUNTER — PROCEDURE VISIT (OUTPATIENT)
Dept: CARDIOLOGY CLINIC | Age: 83
End: 2018-01-31

## 2018-01-31 VITALS — SYSTOLIC BLOOD PRESSURE: 108 MMHG | DIASTOLIC BLOOD PRESSURE: 68 MMHG | HEART RATE: 108 BPM

## 2018-01-31 DIAGNOSIS — Z95.0 CARDIAC PACEMAKER IN SITU: Primary | ICD-10-CM

## 2018-01-31 PROCEDURE — 93280 PM DEVICE PROGR EVAL DUAL: CPT | Performed by: INTERNAL MEDICINE

## 2018-06-06 ENCOUNTER — PROCEDURE VISIT (OUTPATIENT)
Dept: CARDIOLOGY CLINIC | Age: 83
End: 2018-06-06

## 2018-06-06 ENCOUNTER — OFFICE VISIT (OUTPATIENT)
Dept: CARDIOLOGY CLINIC | Age: 83
End: 2018-06-06

## 2018-06-06 VITALS — HEART RATE: 64 BPM | SYSTOLIC BLOOD PRESSURE: 102 MMHG | DIASTOLIC BLOOD PRESSURE: 66 MMHG

## 2018-06-06 VITALS
BODY MASS INDEX: 33.01 KG/M2 | HEIGHT: 66 IN | WEIGHT: 205.4 LBS | DIASTOLIC BLOOD PRESSURE: 66 MMHG | HEART RATE: 64 BPM | SYSTOLIC BLOOD PRESSURE: 102 MMHG

## 2018-06-06 DIAGNOSIS — I48.0 PAF (PAROXYSMAL ATRIAL FIBRILLATION) (HCC): ICD-10-CM

## 2018-06-06 DIAGNOSIS — E11.51 TYPE 2 DIABETES MELLITUS WITH DIABETIC PERIPHERAL ANGIOPATHY WITHOUT GANGRENE, WITH LONG-TERM CURRENT USE OF INSULIN (HCC): ICD-10-CM

## 2018-06-06 DIAGNOSIS — Z95.1 S/P CABG X 3: ICD-10-CM

## 2018-06-06 DIAGNOSIS — I10 ESSENTIAL HYPERTENSION: ICD-10-CM

## 2018-06-06 DIAGNOSIS — I73.9 PAD (PERIPHERAL ARTERY DISEASE) (HCC): ICD-10-CM

## 2018-06-06 DIAGNOSIS — I25.810 CORONARY ARTERY DISEASE INVOLVING CORONARY BYPASS GRAFT OF NATIVE HEART WITHOUT ANGINA PECTORIS: Primary | ICD-10-CM

## 2018-06-06 DIAGNOSIS — Z79.4 TYPE 2 DIABETES MELLITUS WITH DIABETIC PERIPHERAL ANGIOPATHY WITHOUT GANGRENE, WITH LONG-TERM CURRENT USE OF INSULIN (HCC): ICD-10-CM

## 2018-06-06 DIAGNOSIS — Z95.0 CARDIAC PACEMAKER IN SITU: Primary | ICD-10-CM

## 2018-06-06 DIAGNOSIS — Z95.0 S/P PLACEMENT OF CARDIAC PACEMAKER: ICD-10-CM

## 2018-06-06 DIAGNOSIS — E78.00 PURE HYPERCHOLESTEROLEMIA: ICD-10-CM

## 2018-06-06 PROCEDURE — G8417 CALC BMI ABV UP PARAM F/U: HCPCS | Performed by: INTERNAL MEDICINE

## 2018-06-06 PROCEDURE — 99214 OFFICE O/P EST MOD 30 MIN: CPT | Performed by: INTERNAL MEDICINE

## 2018-06-06 PROCEDURE — 1123F ACP DISCUSS/DSCN MKR DOCD: CPT | Performed by: INTERNAL MEDICINE

## 2018-06-06 PROCEDURE — 93280 PM DEVICE PROGR EVAL DUAL: CPT | Performed by: INTERNAL MEDICINE

## 2018-06-06 PROCEDURE — G8427 DOCREV CUR MEDS BY ELIG CLIN: HCPCS | Performed by: INTERNAL MEDICINE

## 2018-06-06 PROCEDURE — 1036F TOBACCO NON-USER: CPT | Performed by: INTERNAL MEDICINE

## 2018-06-06 PROCEDURE — G8599 NO ASA/ANTIPLAT THER USE RNG: HCPCS | Performed by: INTERNAL MEDICINE

## 2018-06-06 PROCEDURE — 4040F PNEUMOC VAC/ADMIN/RCVD: CPT | Performed by: INTERNAL MEDICINE

## 2018-09-12 ENCOUNTER — PROCEDURE VISIT (OUTPATIENT)
Dept: CARDIOLOGY CLINIC | Age: 83
End: 2018-09-12

## 2018-09-12 VITALS — SYSTOLIC BLOOD PRESSURE: 112 MMHG | HEART RATE: 82 BPM | DIASTOLIC BLOOD PRESSURE: 60 MMHG

## 2018-09-12 DIAGNOSIS — Z95.0 CARDIAC PACEMAKER IN SITU: Primary | ICD-10-CM

## 2018-09-12 PROCEDURE — 93280 PM DEVICE PROGR EVAL DUAL: CPT | Performed by: INTERNAL MEDICINE

## 2018-09-12 NOTE — PROCEDURES
Yeny Godwin  80 y.o., male  3/24/1927    Jean Claude Lee MD    Chief Complaint   Patient presents with    Procedure     Pacemaker check     Vitals:    09/12/18 1329   BP: 112/60   Pulse: 82     Pacer analysis is reviewed and filed in the pacer chart. Analysis is consistent with normal Dual-chamber non-MRI [employer] pacer function with stable leads and appropriate battery status for the age of the device. Remaining battery is 2 months. Pacer is  98% pacing in the right ventricle. PAF burden is 2.1% and patient is noted to be on Eliquis for anticoagulation therapy. Recommend monitoring monthly as patient is pacing dependent and would need battery replacement soon.       Pop Pimentel MD, 9/12/2018 4:39 PM

## 2018-09-19 ENCOUNTER — TELEPHONE (OUTPATIENT)
Dept: CARDIOLOGY CLINIC | Age: 83
End: 2018-09-19

## 2018-10-29 ENCOUNTER — APPOINTMENT (OUTPATIENT)
Dept: GENERAL RADIOLOGY | Age: 83
End: 2018-10-29
Payer: MEDICARE

## 2018-10-29 ENCOUNTER — HOSPITAL ENCOUNTER (EMERGENCY)
Age: 83
Discharge: HOME | End: 2018-10-29
Payer: MEDICARE

## 2018-10-29 VITALS
OXYGEN SATURATION: 98 % | BODY MASS INDEX: 32.14 KG/M2 | DIASTOLIC BLOOD PRESSURE: 99 MMHG | RESPIRATION RATE: 17 BRPM | HEIGHT: 66 IN | WEIGHT: 200 LBS | HEART RATE: 60 BPM | SYSTOLIC BLOOD PRESSURE: 130 MMHG | TEMPERATURE: 97.9 F

## 2018-10-29 DIAGNOSIS — M25.571 CHRONIC PAIN OF RIGHT ANKLE: ICD-10-CM

## 2018-10-29 DIAGNOSIS — G89.29 CHRONIC PAIN OF RIGHT ANKLE: ICD-10-CM

## 2018-10-29 DIAGNOSIS — S61.412A LACERATION OF LEFT PALM, INITIAL ENCOUNTER: Primary | ICD-10-CM

## 2018-10-29 PROCEDURE — 6370000000 HC RX 637 (ALT 250 FOR IP): Performed by: PHYSICIAN ASSISTANT

## 2018-10-29 PROCEDURE — 99283 EMERGENCY DEPT VISIT LOW MDM: CPT

## 2018-10-29 PROCEDURE — 2500000003 HC RX 250 WO HCPCS

## 2018-10-29 PROCEDURE — 4500000027

## 2018-10-29 PROCEDURE — 73610 X-RAY EXAM OF ANKLE: CPT

## 2018-10-29 RX ORDER — LIDOCAINE HYDROCHLORIDE 20 MG/ML
10 INJECTION, SOLUTION EPIDURAL; INFILTRATION; INTRACAUDAL; PERINEURAL ONCE
Status: DISCONTINUED | OUTPATIENT
Start: 2018-10-29 | End: 2018-10-29 | Stop reason: HOSPADM

## 2018-10-29 RX ORDER — ACETAMINOPHEN 500 MG
500 TABLET ORAL ONCE
Status: COMPLETED | OUTPATIENT
Start: 2018-10-29 | End: 2018-10-29

## 2018-10-29 RX ADMIN — ACETAMINOPHEN 500 MG: 500 TABLET, FILM COATED ORAL at 12:19

## 2018-10-29 ASSESSMENT — PAIN DESCRIPTION - PAIN TYPE: TYPE: ACUTE PAIN

## 2018-10-29 ASSESSMENT — PAIN SCALES - GENERAL
PAINLEVEL_OUTOF10: 6
PAINLEVEL_OUTOF10: 6

## 2018-10-29 ASSESSMENT — PAIN DESCRIPTION - DESCRIPTORS: DESCRIPTORS: BURNING

## 2018-10-29 ASSESSMENT — PAIN DESCRIPTION - LOCATION: LOCATION: HAND

## 2018-10-29 ASSESSMENT — PAIN DESCRIPTION - ORIENTATION: ORIENTATION: LEFT

## 2018-10-29 NOTE — ED NOTES
Laceration noted to palm of left hand. Bleeding controlled at this time. Pt also reports right ankle pain for some time, would like to be checked out for ankle pain as well. No swelling, bruising or deformity noted to right ankle.      Jennifer Rbuin RN  10/29/18 [id number]

## 2018-10-29 NOTE — ED PROVIDER NOTES
Doppler ultrasound 10/25/11    <50% carotid disease bilaterally    H/O echocardiogram 10/11    EF 62%    H/O echocardiogram 11/24/14    Significant for aortic root and left arterial enlargement. Normal LV systolic but abnormal diastolic function. Mild tricuspid and aortic insufficiencies. Minimum pulmonary HTN.  Hiatal hernia     History of cardiovascular stress test     12/13 Normal EF52%, 10/11 moderate inferior wall myocardial ischemia    History of Doppler echocardiogram 11/01/2017    Normal left ventricle structure and systolic function. Ejection fraction is visually estimated at 55-60%. Grade I diastolic dysfunction. Sclerotic, but non-stenotic aortic valve. Mild aortic regurgitation with pressure half time of 656 msec. Mildly elevated pulmonary artery pressure. Dilated aortic root at 4.2 cm. No evidence of pericardial effusion.     History of Holter monitoring 5/22/14    Event Monitor: Paced rhythm with PACs    HX OTHER MEDICAL     Primary Care Physician Is Dr. Erin Peacock     Resident At [address] [place] [address]     Hypertension     Kidney stones 1980's    \"Passed Kidney Stones\"    Neuropathy     \"Both Feet\"    Nocturia     PAF [facility]paroxysmal atrial fibrillation) ([facility])     Palpitations 5/14    PVD (peripheral vascular disease) ([address] 2/11    mild    S/P CABG x 3 1998    Done In [place] S/P left heart catheterization by percutaneous approach 6/28/10    patency of 2 grafts with possile occlusion of third    S/P placement of cardiac pacemaker 8-7-98, 1-7-11    Nirav Persaud DR Pacemaker Implanted    Slow urinary stream     Staph infection \"Twice In 2001\"    \"Left Knee After Surgery\"    Wears glasses      Past Surgical History:   Procedure Laterality Date    CARDIAC PACEMAKER PLACEMENT  8-7-98    Nirav Persaud DR Pacemaker Implanted    CARDIAC PACEMAKER PLACEMENT  1-7-11    Nirav Persaud DR Pacemaker Implanted   [address]    CABG (Three Bypasses)  CHOLECYSTECTOMY, LAPAROSCOPIC  1999    COLONOSCOPY  \"Several\"    \"Removed Polyps Once\"    DENTAL SURGERY      All Teeth Extracted In Past    INGUINAL HERNIA REPAIR Left 1940    INGUINAL HERNIA REPAIR Right 1980's    JOINT REPLACEMENT Left 2001    Total Left Knee    JOINT REPLACEMENT Left 2001    \"Had Staph Infection , Took Implant Out, Replaced It After 8 Months\"    OTHER SURGICAL HISTORY Left 12/18/13    left spermatocelectomy, left scrotal exploration    PACEMAKER PLACEMENT  8-7-98    Nirav Persaud DR Pacemaker Implanted    PACEMAKER PLACEMENT  1-7-11    Nirav Persaud DR Pacemaker Implanted    TONSILLECTOMY  1930's       CURRENT MEDICATIONS    Current Outpatient Rx   Medication Sig Dispense Refill    ondansetron (ZOFRAN) 4 MG tablet Take 1 tablet by mouth every 8 hours as needed for Nausea 10 tablet 0    docusate sodium (COLACE) 100 MG capsule Take 1 capsule by mouth 2 times daily 30 capsule 0    pioglitazone (ACTOS) 15 MG tablet Take 15 mg by mouth daily      metoprolol tartrate (LOPRESSOR) 25 MG tablet Take 1 tablet by mouth 2 times daily 60 tablet 5    glipiZIDE (GLUCOTROL) 5 MG tablet Take 2 tablets by mouth 2 times daily (before meals) 360 tablet 1    lisinopril (PRINIVIL;ZESTRIL) 5 MG tablet Take 1 tablet by mouth daily (Patient taking differently: Take 2.5 mg by mouth daily ) 90 tablet 1    Insulin Pen Needle (PEN NEEDLES 3/16\") 31G X 5 MM MISC 1 each by Does not apply route daily 100 each 3    gabapentin (NEURONTIN) 300 MG capsule Take 1 capsule by mouth nightly 270 capsule 2    apixaban (ELIQUIS) 2.5 MG TABS tablet Take 1 tablet by mouth 2 times daily (Patient taking differently: Take 5 mg by mouth 2 times daily ) 180 tablet 0    cilostazol (PLETAL) 100 MG tablet Take 1 tablet by mouth 2 times daily 180 tablet 2    insulin glargine (LANTUS) 100 UNIT/ML injection vial Inject 20 Units into the skin nightly 10 vial 2    metFORMIN (GLUCOPHAGE) 1000 MG tablet Take 0.5 tablets by

## 2018-12-12 ENCOUNTER — PROCEDURE VISIT (OUTPATIENT)
Dept: CARDIOLOGY CLINIC | Age: 83
End: 2018-12-12
Payer: MEDICARE

## 2018-12-12 DIAGNOSIS — Z45.010 PACEMAKER BATTERY DEPLETION: Primary | ICD-10-CM

## 2018-12-12 PROCEDURE — 93279 PRGRMG DEV EVAL PM/LDLS PM: CPT | Performed by: INTERNAL MEDICINE

## 2018-12-12 NOTE — LETTER
Andrea Angeles     PROCEDURE: Generator Change Dual Chamber Pacemaker    Date of Procedure: 2018 Time: 2:30 pm   Arrival Time: 12:30 pm    Patient Name: Angelique Hull   : 3/24/1927   MRN# [id number]      HOSPITAL: [facility])    Call to [facility] at [phone]  2 days before your procedure      X Please have blood work and chest-x-ray done 2018 at [facility], [address] [place] [address] or [facility]    X Please do not have anything by mouth after midnight prior to or 8 hours before the procedure. X You may take your medications with a sip of water in the morning before your procedure or take them with you unless listed below. x  ANTICOUGLATION:Hold Eliquis evening dose the night before the procedure and the morning dose of the procedure.     x  Hold Metformin 24 hours before the procedure until you may restart metformin 2  days after the procedure on 2018. ASK JOSS IF HOLD. X Only take 1/2 dose of insulin evening prior to procedure and no insulin morning of procedure. Patient Signature: _________________________________      Staff: Suzi Pack   Staff Given Instructions:___________________________                                  Andrea Vasquezters: [address] Pacemaker    Date of Procedure: 2018 Time: 2:30 pm  Arrival Time: 12:30 pm    Patient Name: Angelique Hull   : 3/24/1927   MRN# [id number]    Day of Procedure Cath Lab Holding area Preop Orders:      X  ANTICOUGLATION: ANTICOUGLATION:Hold Eliquis evening dose the night before the procedure and the morning dose of the procedure. ? YOU HAVE MY PERMISSION TO TALK TO THE PATIENT-PLEASE  ? IV peripheral saline lock (preferred left arm. if right sided implant, right arm). ? Second IV site Right arm (saline lock)  ?  Type & Screen STAT on arrival. ? If taking Coumadin, PT INR  STAT on arrival day of procedure. ? Female patients <=48years of age >> urine HCG test.   ? Diabetic patients >> Accu check Blood sugar check. ? Document home medications in EPIC and include date and time of last dose.   ? NPO.  ? If allergy to contrast >> pre treat for contrast allergy with Benadryl 25 mg IV, Solucortef 100 mg IV and Pepcid 20 mg IV 30 mins pre procedure. ? Notify Dr. Emre Maria of abnormal lab results. ? Chest Prep> Clip hair anterior chest.                    Physician Signature:_______________________Date:__________Time:_________                                         [facility]) Informed Consent for Anesthesia/Sedation, Surgery, Invasive Procedures, and other High-risk Interventions and Medication use      *This consent is applicable for 30 days following patient signature*    Procedure(s)   IBear authorize, Dr. Dev Hanley    and the associate(s) or assistant(s) of his/her choice, to perform the following procedure(s): Generator Change Dual Chamber Pacemaker    I know that unexpected conditions may require additional or different procedures than those above. I authorize the above named practitioner(s) perform these as necessary and desirable. This is based on the practitioners professional judgment. The above named practitioner has discussed the above procedure(s) with me, including:  ? Potential benefits, including likelihood of success of the procedure(s) goals  ? Risks  ? Side effects, risk of death, and risk of infection  ? Any potential problems that might occur during recuperation or healing post-procedure  ? Reasonable alternatives  ? Risks of NOT performing the procedure(s)    I acknowledge that no warranty or guarantee has been made to the results the procedure(s).      I consent to the above named practitioner(s) providing additional services to me as deemed reasonable and necessary, including but not limited to:

## 2018-12-13 ENCOUNTER — TELEPHONE (OUTPATIENT)
Dept: CARDIOLOGY CLINIC | Age: 83
End: 2018-12-13

## 2018-12-13 NOTE — TELEPHONE ENCOUNTER
Patient scheduled for Gen change 12/20/2018 at 2:30 pm, but due to scheduled procedure prior,  will need changed to 12/19/2018 at 1:00 pm. Patient is aware of date and time change.

## 2018-12-17 ENCOUNTER — HOSPITAL ENCOUNTER (OUTPATIENT)
Age: 83
Discharge: HOME | End: 2018-12-17
Payer: MEDICARE

## 2018-12-17 ENCOUNTER — HOSPITAL ENCOUNTER (OUTPATIENT)
Dept: GENERAL RADIOLOGY | Age: 83
Discharge: HOME | End: 2018-12-17
Payer: MEDICARE

## 2018-12-17 DIAGNOSIS — Z01.818 PRE-OPERATIVE EXAM: ICD-10-CM

## 2018-12-17 LAB
ANION GAP SERPL CALCULATED.3IONS-SCNC: 11 MMOL/L (ref 4–16)
APTT: 34 SECONDS (ref 21.2–33)
BUN BLDV-MCNC: 19 MG/DL (ref 6–23)
CALCIUM SERPL-MCNC: 8.6 MG/DL (ref 8.3–10.6)
CHLORIDE BLD-SCNC: 99 MMOL/L (ref 99–110)
CO2: 27 MMOL/L (ref 21–32)
CREAT SERPL-MCNC: 1.3 MG/DL (ref 0.9–1.3)
GFR AFRICAN AMERICAN: >60 ML/MIN/1.73M2
GFR NON-AFRICAN AMERICAN: 52 ML/MIN/1.73M2
GLUCOSE BLD-MCNC: 233 MG/DL (ref 70–99)
HCT VFR BLD CALC: 37.2 % (ref 42–52)
HEMOGLOBIN: 11.3 GM/DL (ref 13.5–18)
INR BLD: 1.31 INDEX
MAGNESIUM: 1.3 MG/DL (ref 1.8–2.4)
MCH RBC QN AUTO: 32.3 PG (ref 27–31)
MCHC RBC AUTO-ENTMCNC: 30.4 % (ref 32–36)
MCV RBC AUTO: 106.3 FL (ref 78–100)
PDW BLD-RTO: 13.8 % (ref 11.7–14.9)
PHOSPHORUS: 3.6 MG/DL (ref 2.5–4.9)
PLATELET # BLD: 148 K/CU MM (ref 140–440)
PMV BLD AUTO: 9.5 FL (ref 7.5–11.1)
POTASSIUM SERPL-SCNC: 4.6 MMOL/L (ref 3.5–5.1)
PROTHROMBIN TIME: 14.9 SECONDS (ref 9.12–12.5)
RBC # BLD: 3.5 M/CU MM (ref 4.6–6.2)
SODIUM BLD-SCNC: 137 MMOL/L (ref 135–145)
WBC # BLD: 5.7 K/CU MM (ref 4–10.5)

## 2018-12-17 PROCEDURE — 85027 COMPLETE CBC AUTOMATED: CPT

## 2018-12-17 PROCEDURE — 36415 COLL VENOUS BLD VENIPUNCTURE: CPT

## 2018-12-17 PROCEDURE — 85730 THROMBOPLASTIN TIME PARTIAL: CPT

## 2018-12-17 PROCEDURE — 84100 ASSAY OF PHOSPHORUS: CPT

## 2018-12-17 PROCEDURE — 85610 PROTHROMBIN TIME: CPT

## 2018-12-17 PROCEDURE — 71046 X-RAY EXAM CHEST 2 VIEWS: CPT

## 2018-12-17 PROCEDURE — 80048 BASIC METABOLIC PNL TOTAL CA: CPT

## 2018-12-17 PROCEDURE — 83735 ASSAY OF MAGNESIUM: CPT

## 2018-12-18 ENCOUNTER — TELEPHONE (OUTPATIENT)
Dept: CARDIOLOGY CLINIC | Age: 83
End: 2018-12-18

## 2018-12-19 ENCOUNTER — APPOINTMENT (OUTPATIENT)
Dept: GENERAL RADIOLOGY | Age: 83
End: 2018-12-19
Attending: INTERNAL MEDICINE
Payer: MEDICARE

## 2018-12-19 ENCOUNTER — HOSPITAL ENCOUNTER (OUTPATIENT)
Dept: CARDIAC CATH/INVASIVE PROCEDURES | Age: 83
Discharge: HOME | End: 2018-12-20
Attending: INTERNAL MEDICINE | Admitting: INTERNAL MEDICINE
Payer: MEDICARE

## 2018-12-19 LAB
ESTIMATED AVERAGE GLUCOSE: 189 MG/DL
GLUCOSE BLD-MCNC: 136 MG/DL (ref 70–99)
GLUCOSE BLD-MCNC: 190 MG/DL (ref 70–99)
GLUCOSE BLD-MCNC: 246 MG/DL (ref 70–99)
HBA1C MFR BLD: 8.2 % (ref 4.2–6.3)

## 2018-12-19 PROCEDURE — C1785 PMKR, DUAL, RATE-RESP: HCPCS

## 2018-12-19 PROCEDURE — C1781 MESH (IMPLANTABLE): HCPCS

## 2018-12-19 PROCEDURE — 33228 REMV&REPLC PM GEN DUAL LEAD: CPT | Performed by: INTERNAL MEDICINE

## 2018-12-19 PROCEDURE — 2500000003 HC RX 250 WO HCPCS

## 2018-12-19 PROCEDURE — 86900 BLOOD TYPING SEROLOGIC ABO: CPT

## 2018-12-19 PROCEDURE — 86901 BLOOD TYPING SEROLOGIC RH(D): CPT

## 2018-12-19 PROCEDURE — 86850 RBC ANTIBODY SCREEN: CPT

## 2018-12-19 PROCEDURE — 2709999900 HC NON-CHARGEABLE SUPPLY

## 2018-12-19 PROCEDURE — 2580000003 HC RX 258: Performed by: INTERNAL MEDICINE

## 2018-12-19 PROCEDURE — 33228 REMV&REPLC PM GEN DUAL LEAD: CPT

## 2018-12-19 PROCEDURE — 6370000000 HC RX 637 (ALT 250 FOR IP): Performed by: INTERNAL MEDICINE

## 2018-12-19 PROCEDURE — 71045 X-RAY EXAM CHEST 1 VIEW: CPT

## 2018-12-19 PROCEDURE — 33210 INSERT ELECTRD/PM CATH SNGL: CPT | Performed by: INTERNAL MEDICINE

## 2018-12-19 PROCEDURE — 82962 GLUCOSE BLOOD TEST: CPT

## 2018-12-19 PROCEDURE — 6360000002 HC RX W HCPCS

## 2018-12-19 PROCEDURE — 36415 COLL VENOUS BLD VENIPUNCTURE: CPT

## 2018-12-19 PROCEDURE — C1894 INTRO/SHEATH, NON-LASER: HCPCS

## 2018-12-19 PROCEDURE — 6360000002 HC RX W HCPCS: Performed by: INTERNAL MEDICINE

## 2018-12-19 PROCEDURE — 2580000003 HC RX 258

## 2018-12-19 PROCEDURE — 83036 HEMOGLOBIN GLYCOSYLATED A1C: CPT

## 2018-12-19 PROCEDURE — 94761 N-INVAS EAR/PLS OXIMETRY MLT: CPT

## 2018-12-19 RX ORDER — PIOGLITAZONEHYDROCHLORIDE 15 MG/1
15 TABLET ORAL DAILY
Status: DISCONTINUED | OUTPATIENT
Start: 2018-12-19 | End: 2018-12-20 | Stop reason: HOSPADM

## 2018-12-19 RX ORDER — DEXTROSE MONOHYDRATE 25 G/50ML
12.5 INJECTION, SOLUTION INTRAVENOUS PRN
Status: DISCONTINUED | OUTPATIENT
Start: 2018-12-19 | End: 2018-12-20 | Stop reason: HOSPADM

## 2018-12-19 RX ORDER — ACETAMINOPHEN 325 MG/1
650 TABLET ORAL EVERY 6 HOURS PRN
Status: DISCONTINUED | OUTPATIENT
Start: 2018-12-19 | End: 2018-12-20 | Stop reason: HOSPADM

## 2018-12-19 RX ORDER — GLIPIZIDE 5 MG/1
10 TABLET ORAL
Status: DISCONTINUED | OUTPATIENT
Start: 2018-12-19 | End: 2018-12-20 | Stop reason: HOSPADM

## 2018-12-19 RX ORDER — SIMVASTATIN 20 MG
20 TABLET ORAL NIGHTLY
Status: DISCONTINUED | OUTPATIENT
Start: 2018-12-19 | End: 2018-12-20 | Stop reason: HOSPADM

## 2018-12-19 RX ORDER — DEXTROSE MONOHYDRATE 50 MG/ML
100 INJECTION, SOLUTION INTRAVENOUS PRN
Status: DISCONTINUED | OUTPATIENT
Start: 2018-12-19 | End: 2018-12-20 | Stop reason: HOSPADM

## 2018-12-19 RX ORDER — ASPIRIN 81 MG/1
81 TABLET ORAL EVERY MORNING
Status: DISCONTINUED | OUTPATIENT
Start: 2018-12-20 | End: 2018-12-20 | Stop reason: HOSPADM

## 2018-12-19 RX ORDER — DOCUSATE SODIUM 100 MG/1
100 CAPSULE, LIQUID FILLED ORAL 2 TIMES DAILY
Status: DISCONTINUED | OUTPATIENT
Start: 2018-12-19 | End: 2018-12-20 | Stop reason: HOSPADM

## 2018-12-19 RX ORDER — CILOSTAZOL 100 MG/1
100 TABLET ORAL 2 TIMES DAILY
Status: DISCONTINUED | OUTPATIENT
Start: 2018-12-19 | End: 2018-12-20 | Stop reason: HOSPADM

## 2018-12-19 RX ORDER — PEN NEEDLE, DIABETIC 30 GX5/16"
1 NEEDLE, DISPOSABLE MISCELLANEOUS DAILY
Status: DISCONTINUED | OUTPATIENT
Start: 2018-12-19 | End: 2018-12-19 | Stop reason: CLARIF

## 2018-12-19 RX ORDER — MAGNESIUM SULFATE 4 G/50ML
4 INJECTION INTRAVENOUS ONCE
Status: COMPLETED | OUTPATIENT
Start: 2018-12-19 | End: 2018-12-19

## 2018-12-19 RX ORDER — NICOTINE POLACRILEX 4 MG
15 LOZENGE BUCCAL PRN
Status: DISCONTINUED | OUTPATIENT
Start: 2018-12-19 | End: 2018-12-20 | Stop reason: HOSPADM

## 2018-12-19 RX ORDER — LISINOPRIL 2.5 MG/1
2.5 TABLET ORAL DAILY
Status: DISCONTINUED | OUTPATIENT
Start: 2018-12-19 | End: 2018-12-20 | Stop reason: HOSPADM

## 2018-12-19 RX ORDER — INSULIN GLARGINE 100 [IU]/ML
20 INJECTION, SOLUTION SUBCUTANEOUS NIGHTLY
Status: DISCONTINUED | OUTPATIENT
Start: 2018-12-19 | End: 2018-12-20 | Stop reason: HOSPADM

## 2018-12-19 RX ORDER — TAMSULOSIN HYDROCHLORIDE 0.4 MG/1
0.4 CAPSULE ORAL DAILY
Status: DISCONTINUED | OUTPATIENT
Start: 2018-12-19 | End: 2018-12-20 | Stop reason: HOSPADM

## 2018-12-19 RX ORDER — ONDANSETRON 4 MG/1
4 TABLET, ORALLY DISINTEGRATING ORAL EVERY 8 HOURS PRN
Status: DISCONTINUED | OUTPATIENT
Start: 2018-12-19 | End: 2018-12-20 | Stop reason: HOSPADM

## 2018-12-19 RX ORDER — GABAPENTIN 300 MG/1
300 CAPSULE ORAL NIGHTLY
Status: DISCONTINUED | OUTPATIENT
Start: 2018-12-19 | End: 2018-12-20 | Stop reason: HOSPADM

## 2018-12-19 RX ORDER — SODIUM CHLORIDE 0.9 % (FLUSH) 0.9 %
10 SYRINGE (ML) INJECTION PRN
Status: DISCONTINUED | OUTPATIENT
Start: 2018-12-19 | End: 2018-12-20 | Stop reason: HOSPADM

## 2018-12-19 RX ORDER — SODIUM CHLORIDE 0.9 % (FLUSH) 0.9 %
10 SYRINGE (ML) INJECTION EVERY 12 HOURS SCHEDULED
Status: DISCONTINUED | OUTPATIENT
Start: 2018-12-19 | End: 2018-12-20 | Stop reason: HOSPADM

## 2018-12-19 RX ADMIN — CILOSTAZOL 100 MG: 100 TABLET ORAL at 20:36

## 2018-12-19 RX ADMIN — MAGNESIUM SULFATE HEPTAHYDRATE 4 G: 80 INJECTION, SOLUTION INTRAVENOUS at 09:57

## 2018-12-19 RX ADMIN — PIOGLITAZONE 15 MG: 15 TABLET ORAL at 17:47

## 2018-12-19 RX ADMIN — GLIPIZIDE 10 MG: 5 TABLET ORAL at 17:45

## 2018-12-19 RX ADMIN — LISINOPRIL 2.5 MG: 2.5 TABLET ORAL at 17:45

## 2018-12-19 RX ADMIN — INSULIN GLARGINE 20 UNITS: 100 INJECTION, SOLUTION SUBCUTANEOUS at 20:38

## 2018-12-19 RX ADMIN — SODIUM CHLORIDE, PRESERVATIVE FREE 10 ML: 5 INJECTION INTRAVENOUS at 20:37

## 2018-12-19 RX ADMIN — SIMVASTATIN 20 MG: 20 TABLET, FILM COATED ORAL at 20:36

## 2018-12-19 RX ADMIN — TAMSULOSIN HYDROCHLORIDE 0.4 MG: 0.4 CAPSULE ORAL at 17:45

## 2018-12-19 RX ADMIN — DOCUSATE SODIUM 100 MG: 100 CAPSULE, LIQUID FILLED ORAL at 20:35

## 2018-12-19 RX ADMIN — ACETAMINOPHEN 650 MG: 325 TABLET, FILM COATED ORAL at 20:36

## 2018-12-19 RX ADMIN — GABAPENTIN 300 MG: 300 CAPSULE ORAL at 20:37

## 2018-12-19 RX ADMIN — INSULIN LISPRO 1 UNITS: 100 INJECTION, SOLUTION INTRAVENOUS; SUBCUTANEOUS at 20:37

## 2018-12-19 ASSESSMENT — PAIN DESCRIPTION - LOCATION: LOCATION: HEAD

## 2018-12-19 ASSESSMENT — PAIN DESCRIPTION - DESCRIPTORS: DESCRIPTORS: HEADACHE

## 2018-12-19 ASSESSMENT — PAIN DESCRIPTION - ONSET: ONSET: GRADUAL

## 2018-12-19 ASSESSMENT — PAIN DESCRIPTION - ORIENTATION: ORIENTATION: POSTERIOR

## 2018-12-19 ASSESSMENT — PAIN DESCRIPTION - PAIN TYPE: TYPE: ACUTE PAIN

## 2018-12-19 ASSESSMENT — PAIN DESCRIPTION - FREQUENCY: FREQUENCY: INTERMITTENT

## 2018-12-19 ASSESSMENT — PAIN SCALES - GENERAL: PAINLEVEL_OUTOF10: 3

## 2018-12-19 NOTE — PROGRESS NOTES
enlargement. Normal LV systolic but abnormal diastolic function. Mild tricuspid and aortic insufficiencies. Minimum pulmonary HTN.  Hiatal hernia     History of cardiovascular stress test     12/13 Normal EF52%, 10/11 moderate inferior wall myocardial ischemia    History of Doppler echocardiogram 11/01/2017    Normal left ventricle structure and systolic function. Ejection fraction is visually estimated at 55-60%. Grade I diastolic dysfunction. Sclerotic, but non-stenotic aortic valve. Mild aortic regurgitation with pressure half time of 656 msec. Mildly elevated pulmonary artery pressure. Dilated aortic root at 4.2 cm. No evidence of pericardial effusion.     History of Holter monitoring 5/22/14    Event Monitor: Paced rhythm with PACs    HX OTHER MEDICAL     Primary Care Physician Is Dr. Carmelo Busby     Resident At [address] [place] [address]     Hypertension     Kidney stones 1980's    \"Passed Kidney Stones\"    Neuropathy     \"Both Feet\"    Nocturia     PAF (paroxysmal atrial fibrillation) ([facility])     Palpitations 5/14    PVD [facility]peripheral vascular disease) ([facility] 2/11    mild    S/P CABG x 3 1998    Done In [place] S/P left heart catheterization by percutaneous approach 6/28/10    patency of 2 grafts with possile occlusion of third    S/P placement of cardiac pacemaker 8-7-98, 1-7-11    Medtronic Hung REEVES Pacemaker Implanted    Slow urinary stream     Staph infection \"Twice In 2001\"    \"Left Knee After Surgery\"    Wears glasses      Past Surgical History:   Procedure Laterality Date    CARDIAC PACEMAKER PLACEMENT  8-7-98    Nirav Persaud DR Pacemaker Implanted    CARDIAC PACEMAKER PLACEMENT  1-7-11    Medtronic Hung REEVES Pacemaker Implanted   [facility]    CABG ([address] Bypass[facility])    CHOLECYSTECTOMY, LAPAROSCOPIC  1999    COLONOSCOPY  \"Several\"    \"Removed Polyps Once\"    DENTAL SURGERY      All Teeth Extracted In Past    INGUINAL HERNIA REPAIR Left 1940    rash    Objective:  /66   Pulse 65   Temp 98.7 °F (37.1 °C) (Temporal)   Resp 14   Ht 5' 6\" (1.676 m)   Wt 200 lb (90.7 kg)   SpO2 99%   BMI 32.28 kg/m²   Wt Readings from Last 3 Encounters:   12/19/18 200 lb (90.7 kg)   10/29/18 200 lb (90.7 kg)   09/15/18 200 lb (90.7 kg)     Body mass index is 32.28 kg/m². GENERAL - Alert, oriented, pleasant, in no apparent distress. EYES: No jaundice, no conjunctival pallor. SKIN: It is warm & dry. No rashes. No Echhymosis    HEENT - No clinically significant abnormalities seen. Neck - Supple. No jugular venous distention noted. No carotid bruits. Cardiovascular - Normal S1 and S2 without obvious murmur or gallop. Extremities - No cyanosis, clubbing, or significant edema. Pulmonary - No respiratory distress. No wheezes or rales. Abdomen - No masses, tenderness, or organomegaly. Musculoskeletal - No significant edema. No joint deformities. No muscle wasting. Neurologic - Cranial nerves II through XII are grossly intact. There were no gross focal neurologic abnormalities.     Lab Review   Lab Results   Component Value Date    TROPONINT <0.010 01/31/2017     BNP:    Lab Results   Component Value Date    BNP 28 05/16/2014     PT/INR:    Lab Results   Component Value Date    INR 1.31 12/17/2018     Lab Results   Component Value Date    LABA1C 7.5 (H) 01/31/2017    LABA1C 7.3 (H) 10/07/2016     Lab Results   Component Value Date    WBC 5.7 12/17/2018    HCT 37.2 (L) 12/17/2018    .3 (H) 12/17/2018     12/17/2018     Lab Results   Component Value Date    CHOL 105 10/07/2016    TRIG 66 10/07/2016    HDL 49 10/07/2016    LDLCALC 43 10/07/2016    LDLDIRECT 49 07/12/2016     Lab Results   Component Value Date    ALT 10 09/15/2018    AST 18 09/15/2018     BMP:    Lab Results   Component Value Date     12/17/2018    K 4.6 12/17/2018    CL 99 12/17/2018    CO2 27 12/17/2018    BUN 19 12/17/2018    CREATININE 1.3 12/17/2018     CMP:   Lab

## 2018-12-20 VITALS
OXYGEN SATURATION: 99 % | WEIGHT: 199.1 LBS | TEMPERATURE: 97.9 F | HEIGHT: 66 IN | RESPIRATION RATE: 15 BRPM | BODY MASS INDEX: 32 KG/M2 | SYSTOLIC BLOOD PRESSURE: 113 MMHG | HEART RATE: 79 BPM | DIASTOLIC BLOOD PRESSURE: 65 MMHG

## 2018-12-20 LAB — GLUCOSE BLD-MCNC: 124 MG/DL (ref 70–99)

## 2018-12-20 PROCEDURE — 82962 GLUCOSE BLOOD TEST: CPT

## 2018-12-20 PROCEDURE — 2580000003 HC RX 258: Performed by: INTERNAL MEDICINE

## 2018-12-20 PROCEDURE — 6370000000 HC RX 637 (ALT 250 FOR IP): Performed by: INTERNAL MEDICINE

## 2018-12-20 RX ADMIN — PIOGLITAZONE 15 MG: 15 TABLET ORAL at 10:14

## 2018-12-20 RX ADMIN — ASPIRIN 81 MG: 81 TABLET, COATED ORAL at 09:08

## 2018-12-20 RX ADMIN — GLIPIZIDE 10 MG: 5 TABLET ORAL at 09:07

## 2018-12-20 RX ADMIN — CILOSTAZOL 100 MG: 100 TABLET ORAL at 09:07

## 2018-12-20 RX ADMIN — Medication 2000 UNITS: at 09:07

## 2018-12-20 RX ADMIN — LISINOPRIL 2.5 MG: 2.5 TABLET ORAL at 09:08

## 2018-12-20 RX ADMIN — SODIUM CHLORIDE, PRESERVATIVE FREE 10 ML: 5 INJECTION INTRAVENOUS at 09:12

## 2018-12-20 RX ADMIN — DOCUSATE SODIUM 100 MG: 100 CAPSULE, LIQUID FILLED ORAL at 09:15

## 2018-12-20 RX ADMIN — METFORMIN HYDROCHLORIDE 500 MG: 500 TABLET ORAL at 09:08

## 2018-12-20 RX ADMIN — TAMSULOSIN HYDROCHLORIDE 0.4 MG: 0.4 CAPSULE ORAL at 09:08

## 2018-12-20 ASSESSMENT — PAIN SCALES - GENERAL: PAINLEVEL_OUTOF10: 0

## 2018-12-20 NOTE — OP NOTE
PROCEDURE PERFORMED:      1. New dual chamber pacemaker Generator change  2. Old dual chamber pacemaker Generator removal  3. Temporary pacemaker placement    Attending : Elvie Loya MD    REFERRING PHYSICIAN: Dr. Lewis Chick: None. ESTIMATED BLOOD LOSS: 20 cc. ANESTHESIA: Versed, Fentanyl. OTHER MEDICATIONS: Cefazolin 1 g IV preoperatively. HISTORY:  Patient with hx of complete heart block  s/p pacemaker with device now at [facility]: The patient was brought to the electrophysiology laboratory in stable condition. The patient was in a fasting, nonsedated state. The risks, benefits and alternatives of the procedure were discussed with the patient. The risks including, but not limited to, the risks of infection, bleeding, lead or device malfunction were all discussed. The patient considered his treatment options and decided to proceed with the pacemaker generator change. Temporary RV pacemaker lead was placed under fluoroscopy from Right femoral vein as patient was complete heart block. Temporary RV pacemaker lead was removed after generator was replaced    The patient was prepped and draped in a sterile fashion. An approximately 3 -cm incision was made over the existing scar in the left pectoral area after administration of lidocaine. Using electrocautery and blunt dissection, the pocket was accessed and the device was externalized. Each lead was disconnected from old device and then old device was removed from the pocket. Each of the leads was electronically analyzed for proper function. The pocket was irrigated with an antibiotic solution. The leads were then connected to the new pulse generator and placed into the cleaned pocket. Final parameters were obtained and are detailed below. The pocket was closed in three successive layers using 2-0, 2-0 and 4-0 Vicryl suture material. Steri-Strips and a pressure dressing were applied.  The patient was transported to the

## 2018-12-24 ENCOUNTER — TELEPHONE (OUTPATIENT)
Dept: CARDIOLOGY CLINIC | Age: 83
End: 2018-12-24

## 2018-12-27 ENCOUNTER — OFFICE VISIT (OUTPATIENT)
Dept: CARDIOLOGY CLINIC | Age: 83
End: 2018-12-27
Payer: MEDICARE

## 2018-12-27 VITALS
SYSTOLIC BLOOD PRESSURE: 120 MMHG | HEIGHT: 66 IN | DIASTOLIC BLOOD PRESSURE: 70 MMHG | HEART RATE: 78 BPM | WEIGHT: 204.6 LBS | BODY MASS INDEX: 32.88 KG/M2

## 2018-12-27 DIAGNOSIS — E11.51 TYPE 2 DIABETES MELLITUS WITH DIABETIC PERIPHERAL ANGIOPATHY WITHOUT GANGRENE, WITH LONG-TERM CURRENT USE OF INSULIN (HCC): ICD-10-CM

## 2018-12-27 DIAGNOSIS — Z95.0 S/P PLACEMENT OF CARDIAC PACEMAKER: ICD-10-CM

## 2018-12-27 DIAGNOSIS — I10 ESSENTIAL HYPERTENSION: ICD-10-CM

## 2018-12-27 DIAGNOSIS — Z79.4 TYPE 2 DIABETES MELLITUS WITH DIABETIC PERIPHERAL ANGIOPATHY WITHOUT GANGRENE, WITH LONG-TERM CURRENT USE OF INSULIN (HCC): ICD-10-CM

## 2018-12-27 DIAGNOSIS — I25.810 CORONARY ARTERY DISEASE INVOLVING CORONARY BYPASS GRAFT OF NATIVE HEART WITHOUT ANGINA PECTORIS: Primary | ICD-10-CM

## 2018-12-27 DIAGNOSIS — Z95.1 S/P CABG X 3: ICD-10-CM

## 2018-12-27 DIAGNOSIS — I48.0 PAF (PAROXYSMAL ATRIAL FIBRILLATION) (HCC): ICD-10-CM

## 2018-12-27 DIAGNOSIS — E78.00 PURE HYPERCHOLESTEROLEMIA: ICD-10-CM

## 2018-12-27 DIAGNOSIS — I73.9 PAD (PERIPHERAL ARTERY DISEASE) (HCC): ICD-10-CM

## 2018-12-27 PROCEDURE — 1101F PT FALLS ASSESS-DOCD LE1/YR: CPT | Performed by: INTERNAL MEDICINE

## 2018-12-27 PROCEDURE — G8417 CALC BMI ABV UP PARAM F/U: HCPCS | Performed by: INTERNAL MEDICINE

## 2018-12-27 PROCEDURE — 4040F PNEUMOC VAC/ADMIN/RCVD: CPT | Performed by: INTERNAL MEDICINE

## 2018-12-27 PROCEDURE — G8598 ASA/ANTIPLAT THER USED: HCPCS | Performed by: INTERNAL MEDICINE

## 2018-12-27 PROCEDURE — 1123F ACP DISCUSS/DSCN MKR DOCD: CPT | Performed by: INTERNAL MEDICINE

## 2018-12-27 PROCEDURE — G8427 DOCREV CUR MEDS BY ELIG CLIN: HCPCS | Performed by: INTERNAL MEDICINE

## 2018-12-27 PROCEDURE — 99214 OFFICE O/P EST MOD 30 MIN: CPT | Performed by: INTERNAL MEDICINE

## 2018-12-27 PROCEDURE — G8484 FLU IMMUNIZE NO ADMIN: HCPCS | Performed by: INTERNAL MEDICINE

## 2018-12-27 PROCEDURE — 1036F TOBACCO NON-USER: CPT | Performed by: INTERNAL MEDICINE

## 2018-12-27 NOTE — PROGRESS NOTES
\"Removed Polyps Once\"    DENTAL SURGERY      All Teeth Extracted In Past    INGUINAL HERNIA REPAIR Left 1940    INGUINAL HERNIA REPAIR Right 1980's    JOINT REPLACEMENT Left 2001    Total Left Knee    JOINT REPLACEMENT Left 2001    \"Had Staph Infection , Took Implant Out, Replaced It After 8 Months\"    OTHER SURGICAL HISTORY Left 12/18/13    left spermatocelectomy, left scrotal exploration    PACEMAKER PLACEMENT  8-7-98    Nirav Persaud DR Pacemaker Implanted    PACEMAKER PLACEMENT  1-7-11    Medkaterin Persaud DR Pacemaker Implanted    PACEMAKER PLACEMENT Left 12/19/2018    previous device explanted, Medtronic Adapta ADD R01 implanted.  TONSILLECTOMY  1930's      As reviewed   Family History   Problem Relation Age of Onset   Barbara Hattie Stroke Mother     Diabetes Mother     Cancer Mother         \"Breast Cancer\"    Stroke Father     High Blood Pressure Father     Arthritis Sister     Heart Disease Brother     Other Daughter         \"Grossly Overweight\"    Other Daughter         \"Sleep Apnea\"    Arthritis Son      Social History   Substance Use Topics    Smoking status: Former Smoker     Packs/day: 1.00     Years: 35.00     Start date: 12/16/1946     Quit date: 1/1/1981    Smokeless tobacco: Former User     Quit date: 12/16/1981    Alcohol use No      Review of Systems:    Constitutional: Negative for diaphoresis and fatigue  Psychological:Negative for anxiety or depression  HENT: Negative for headaches, nasal congestion, sinus pain or vertigo  Eyes: Negative for visual disturbance.    Endocrine: Negative for polydipsia/polyuria  Respiratory: Negative for shortness of breath  Cardiovascular: Negative for chest pain, dyspnea on exertion, claudication, edema, irregular heartbeat, murmur, palpitations or shortness of breath  Gastrointestinal: Negative for abdominal pain or heartburn  Genito-Urinary: Negative for urinary frequency/urgency  Musculoskeletal: Negative for muscle pain, muscular weakness,

## 2019-01-02 ENCOUNTER — NURSE ONLY (OUTPATIENT)
Dept: CARDIOLOGY CLINIC | Age: 84
End: 2019-01-02

## 2019-01-02 ENCOUNTER — TELEPHONE (OUTPATIENT)
Dept: CARDIOLOGY CLINIC | Age: 84
End: 2019-01-02

## 2019-01-02 VITALS — TEMPERATURE: 97.5 F

## 2019-01-02 DIAGNOSIS — Z95.0 STATUS POST PLACEMENT OF CARDIAC PACEMAKER: Primary | ICD-10-CM

## 2019-01-02 PROCEDURE — 99024 POSTOP FOLLOW-UP VISIT: CPT | Performed by: INTERNAL MEDICINE

## 2019-01-09 ENCOUNTER — TELEPHONE (OUTPATIENT)
Dept: CARDIOLOGY CLINIC | Age: 84
End: 2019-01-09

## 2019-01-17 ENCOUNTER — TELEPHONE (OUTPATIENT)
Dept: CARDIOLOGY CLINIC | Age: 84
End: 2019-01-17

## 2019-01-22 ENCOUNTER — TELEPHONE (OUTPATIENT)
Dept: CARDIOLOGY CLINIC | Age: 84
End: 2019-01-22

## 2019-01-28 ENCOUNTER — TELEPHONE (OUTPATIENT)
Dept: CARDIOLOGY CLINIC | Age: 84
End: 2019-01-28

## 2019-02-01 ENCOUNTER — OFFICE VISIT (OUTPATIENT)
Dept: CARDIOLOGY CLINIC | Age: 84
End: 2019-02-01
Payer: MEDICARE

## 2019-02-01 VITALS
DIASTOLIC BLOOD PRESSURE: 70 MMHG | HEIGHT: 66 IN | HEART RATE: 84 BPM | BODY MASS INDEX: 32.98 KG/M2 | WEIGHT: 205.2 LBS | SYSTOLIC BLOOD PRESSURE: 138 MMHG

## 2019-02-01 DIAGNOSIS — Z95.0 S/P PLACEMENT OF CARDIAC PACEMAKER: ICD-10-CM

## 2019-02-01 DIAGNOSIS — Z45.010 PACEMAKER AT END OF BATTERY LIFE: Primary | ICD-10-CM

## 2019-02-01 PROCEDURE — 93280 PM DEVICE PROGR EVAL DUAL: CPT | Performed by: INTERNAL MEDICINE

## 2019-02-01 PROCEDURE — 99024 POSTOP FOLLOW-UP VISIT: CPT | Performed by: NURSE PRACTITIONER

## 2019-02-05 ENCOUNTER — TELEPHONE (OUTPATIENT)
Dept: CARDIOLOGY CLINIC | Age: 84
End: 2019-02-05

## 2019-02-15 ENCOUNTER — TELEPHONE (OUTPATIENT)
Dept: CARDIOLOGY CLINIC | Age: 84
End: 2019-02-15

## 2019-02-22 ENCOUNTER — TELEPHONE (OUTPATIENT)
Dept: CARDIOLOGY CLINIC | Age: 84
End: 2019-02-22

## 2019-02-22 ENCOUNTER — NURSE ONLY (OUTPATIENT)
Dept: CARDIOLOGY CLINIC | Age: 84
End: 2019-02-22
Payer: MEDICARE

## 2019-02-22 DIAGNOSIS — Z95.0 CARDIAC PACEMAKER IN SITU: Primary | ICD-10-CM

## 2019-02-22 PROCEDURE — 93280 PM DEVICE PROGR EVAL DUAL: CPT | Performed by: INTERNAL MEDICINE

## 2019-03-09 ENCOUNTER — APPOINTMENT (OUTPATIENT)
Dept: GENERAL RADIOLOGY | Age: 84
End: 2019-03-09
Payer: MEDICARE

## 2019-03-09 ENCOUNTER — HOSPITAL ENCOUNTER (EMERGENCY)
Age: 84
Discharge: HOME | End: 2019-03-09
Attending: EMERGENCY MEDICINE
Payer: MEDICARE

## 2019-03-09 VITALS
TEMPERATURE: 98.2 F | BODY MASS INDEX: 32.14 KG/M2 | HEART RATE: 93 BPM | WEIGHT: 200 LBS | RESPIRATION RATE: 17 BRPM | SYSTOLIC BLOOD PRESSURE: 134 MMHG | OXYGEN SATURATION: 92 % | HEIGHT: 66 IN | DIASTOLIC BLOOD PRESSURE: 84 MMHG

## 2019-03-09 DIAGNOSIS — J40 BRONCHITIS: Primary | ICD-10-CM

## 2019-03-09 LAB
ADENOVIRUS DETECTION BY PCR: NOT DETECTED
ALBUMIN SERPL-MCNC: 3.9 GM/DL (ref 3.4–5)
ALP BLD-CCNC: 86 IU/L (ref 40–128)
ALT SERPL-CCNC: 13 U/L (ref 10–40)
ANION GAP SERPL CALCULATED.3IONS-SCNC: 9 MMOL/L (ref 4–16)
AST SERPL-CCNC: 26 IU/L (ref 15–37)
BASOPHILS ABSOLUTE: 0 K/CU MM
BASOPHILS RELATIVE PERCENT: 0.7 % (ref 0–1)
BILIRUB SERPL-MCNC: 0.4 MG/DL (ref 0–1)
BORDETELLA PERTUSSIS PCR: NOT DETECTED
BUN BLDV-MCNC: 22 MG/DL (ref 6–23)
CALCIUM SERPL-MCNC: 9.1 MG/DL (ref 8.3–10.6)
CHLAMYDOPHILA PNEUMONIA PCR: NOT DETECTED
CHLORIDE BLD-SCNC: 100 MMOL/L (ref 99–110)
CO2: 27 MMOL/L (ref 21–32)
CORONAVIRUS 229E PCR: NOT DETECTED
CORONAVIRUS HKU1 PCR: NOT DETECTED
CORONAVIRUS NL63 PCR: NOT DETECTED
CORONAVIRUS OC43 PCR: NOT DETECTED
CREAT SERPL-MCNC: 1.1 MG/DL (ref 0.9–1.3)
DIFFERENTIAL TYPE: ABNORMAL
EOSINOPHILS ABSOLUTE: 0.2 K/CU MM
EOSINOPHILS RELATIVE PERCENT: 4.2 % (ref 0–3)
GFR AFRICAN AMERICAN: >60 ML/MIN/1.73M2
GFR NON-AFRICAN AMERICAN: >60 ML/MIN/1.73M2
GLUCOSE BLD-MCNC: 135 MG/DL (ref 70–99)
HCT VFR BLD CALC: 33.5 % (ref 42–52)
HEMOGLOBIN: 10.8 GM/DL (ref 13.5–18)
HUMAN METAPNEUMOVIRUS PCR: ABNORMAL
IMMATURE NEUTROPHIL %: 0.4 % (ref 0–0.43)
INFLUENZA A BY PCR: NOT DETECTED
INFLUENZA A H1 (2009) PCR: NOT DETECTED
INFLUENZA A H1 PANDEMIC PCR: NOT DETECTED
INFLUENZA A H3 PCR: NOT DETECTED
INFLUENZA B BY PCR: NOT DETECTED
LYMPHOCYTES ABSOLUTE: 0.9 K/CU MM
LYMPHOCYTES RELATIVE PERCENT: 21 % (ref 24–44)
MCH RBC QN AUTO: 32.8 PG (ref 27–31)
MCHC RBC AUTO-ENTMCNC: 32.2 % (ref 32–36)
MCV RBC AUTO: 101.8 FL (ref 78–100)
MONOCYTES ABSOLUTE: 0.6 K/CU MM
MONOCYTES RELATIVE PERCENT: 12.9 % (ref 0–4)
MYCOPLASMA PNEUMONIAE PCR: NOT DETECTED
NUCLEATED RBC %: 0 %
PARAINFLUENZA 1 PCR: NOT DETECTED
PARAINFLUENZA 2 PCR: NOT DETECTED
PARAINFLUENZA 3 PCR: NOT DETECTED
PARAINFLUENZA 4 PCR: NOT DETECTED
PDW BLD-RTO: 13.5 % (ref 11.7–14.9)
PLATELET # BLD: 123 K/CU MM (ref 140–440)
PMV BLD AUTO: 9.1 FL (ref 7.5–11.1)
POTASSIUM SERPL-SCNC: 4.6 MMOL/L (ref 3.5–5.1)
RBC # BLD: 3.29 M/CU MM (ref 4.6–6.2)
RHINOVIRUS ENTEROVIRUS PCR: NOT DETECTED
RSV PCR: NOT DETECTED
SEGMENTED NEUTROPHILS ABSOLUTE COUNT: 2.7 K/CU MM
SEGMENTED NEUTROPHILS RELATIVE PERCENT: 60.8 % (ref 36–66)
SODIUM BLD-SCNC: 136 MMOL/L (ref 135–145)
TOTAL IMMATURE NEUTOROPHIL: 0.02 K/CU MM
TOTAL NUCLEATED RBC: 0 K/CU MM
TOTAL PROTEIN: 6.7 GM/DL (ref 6.4–8.2)
WBC # BLD: 4.5 K/CU MM (ref 4–10.5)

## 2019-03-09 PROCEDURE — 71046 X-RAY EXAM CHEST 2 VIEWS: CPT

## 2019-03-09 PROCEDURE — 85025 COMPLETE CBC W/AUTO DIFF WBC: CPT

## 2019-03-09 PROCEDURE — 94761 N-INVAS EAR/PLS OXIMETRY MLT: CPT

## 2019-03-09 PROCEDURE — 87486 CHLMYD PNEUM DNA AMP PROBE: CPT

## 2019-03-09 PROCEDURE — 87798 DETECT AGENT NOS DNA AMP: CPT

## 2019-03-09 PROCEDURE — 99284 EMERGENCY DEPT VISIT MOD MDM: CPT

## 2019-03-09 PROCEDURE — 6370000000 HC RX 637 (ALT 250 FOR IP): Performed by: EMERGENCY MEDICINE

## 2019-03-09 PROCEDURE — 87581 M.PNEUMON DNA AMP PROBE: CPT

## 2019-03-09 PROCEDURE — 80053 COMPREHEN METABOLIC PANEL: CPT

## 2019-03-09 PROCEDURE — 93005 ELECTROCARDIOGRAM TRACING: CPT | Performed by: EMERGENCY MEDICINE

## 2019-03-09 RX ORDER — IPRATROPIUM BROMIDE AND ALBUTEROL SULFATE 2.5; .5 MG/3ML; MG/3ML
1 SOLUTION RESPIRATORY (INHALATION) ONCE
Status: COMPLETED | OUTPATIENT
Start: 2019-03-09 | End: 2019-03-09

## 2019-03-09 RX ORDER — GUAIFENESIN 600 MG/1
600 TABLET, EXTENDED RELEASE ORAL 2 TIMES DAILY
Qty: 30 TABLET | Refills: 0 | Status: SHIPPED | OUTPATIENT
Start: 2019-03-09 | End: 2019-03-24

## 2019-03-09 RX ORDER — ALBUTEROL SULFATE 90 UG/1
2 AEROSOL, METERED RESPIRATORY (INHALATION) EVERY 4 HOURS PRN
Qty: 1 INHALER | Refills: 1 | Status: ON HOLD | OUTPATIENT
Start: 2019-03-09 | End: 2019-12-31 | Stop reason: ALTCHOICE

## 2019-03-09 RX ADMIN — IPRATROPIUM BROMIDE AND ALBUTEROL SULFATE 1 AMPULE: .5; 3 SOLUTION RESPIRATORY (INHALATION) at 05:45

## 2019-03-11 PROCEDURE — 93010 ELECTROCARDIOGRAM REPORT: CPT | Performed by: INTERNAL MEDICINE

## 2019-03-19 LAB
EKG ATRIAL RATE: 81 BPM
EKG DIAGNOSIS: NORMAL
EKG Q-T INTERVAL: 390 MS
EKG QRS DURATION: 134 MS
EKG QTC CALCULATION (BAZETT): 510 MS
EKG R AXIS: 138 DEGREES
EKG T AXIS: -56 DEGREES
EKG VENTRICULAR RATE: 103 BPM

## 2019-03-22 ENCOUNTER — APPOINTMENT (OUTPATIENT)
Dept: CT IMAGING | Age: 84
End: 2019-03-22
Payer: MEDICARE

## 2019-03-22 ENCOUNTER — HOSPITAL ENCOUNTER (EMERGENCY)
Age: 84
Discharge: HOME | End: 2019-03-22
Attending: EMERGENCY MEDICINE
Payer: MEDICARE

## 2019-03-22 VITALS
OXYGEN SATURATION: 96 % | HEART RATE: 84 BPM | HEIGHT: 66 IN | RESPIRATION RATE: 17 BRPM | WEIGHT: 200 LBS | DIASTOLIC BLOOD PRESSURE: 58 MMHG | BODY MASS INDEX: 32.14 KG/M2 | SYSTOLIC BLOOD PRESSURE: 132 MMHG

## 2019-03-22 DIAGNOSIS — R19.7 DIARRHEA, UNSPECIFIED TYPE: Primary | ICD-10-CM

## 2019-03-22 DIAGNOSIS — R10.9 ABDOMINAL PAIN, UNSPECIFIED ABDOMINAL LOCATION: ICD-10-CM

## 2019-03-22 LAB
ALBUMIN SERPL-MCNC: 3.3 GM/DL (ref 3.4–5)
ALP BLD-CCNC: 91 IU/L (ref 40–129)
ALT SERPL-CCNC: 10 U/L (ref 10–40)
ANION GAP SERPL CALCULATED.3IONS-SCNC: 12 MMOL/L (ref 4–16)
AST SERPL-CCNC: 19 IU/L (ref 15–37)
BASOPHILS ABSOLUTE: 0 K/CU MM
BASOPHILS RELATIVE PERCENT: 0.6 % (ref 0–1)
BILIRUB SERPL-MCNC: 0.4 MG/DL (ref 0–1)
BUN BLDV-MCNC: 19 MG/DL (ref 6–23)
CALCIUM SERPL-MCNC: 8.2 MG/DL (ref 8.3–10.6)
CHLORIDE BLD-SCNC: 100 MMOL/L (ref 99–110)
CO2: 24 MMOL/L (ref 21–32)
CREAT SERPL-MCNC: 1 MG/DL (ref 0.9–1.3)
DIFFERENTIAL TYPE: ABNORMAL
EOSINOPHILS ABSOLUTE: 0.2 K/CU MM
EOSINOPHILS RELATIVE PERCENT: 2.5 % (ref 0–3)
GFR AFRICAN AMERICAN: >60 ML/MIN/1.73M2
GFR NON-AFRICAN AMERICAN: >60 ML/MIN/1.73M2
GLUCOSE BLD-MCNC: 242 MG/DL (ref 70–99)
HCT VFR BLD CALC: 30.4 % (ref 42–52)
HEMOGLOBIN: 9.8 GM/DL (ref 13.5–18)
IMMATURE NEUTROPHIL %: 0.2 % (ref 0–0.43)
LIPASE: 22 IU/L (ref 13–60)
LYMPHOCYTES ABSOLUTE: 0.9 K/CU MM
LYMPHOCYTES RELATIVE PERCENT: 14.5 % (ref 24–44)
MCH RBC QN AUTO: 32.9 PG (ref 27–31)
MCHC RBC AUTO-ENTMCNC: 32.2 % (ref 32–36)
MCV RBC AUTO: 102 FL (ref 78–100)
MONOCYTES ABSOLUTE: 0.5 K/CU MM
MONOCYTES RELATIVE PERCENT: 7.9 % (ref 0–4)
NUCLEATED RBC %: 0 %
PDW BLD-RTO: 13.4 % (ref 11.7–14.9)
PLATELET # BLD: 168 K/CU MM (ref 140–440)
PMV BLD AUTO: 9.1 FL (ref 7.5–11.1)
POTASSIUM SERPL-SCNC: 4.3 MMOL/L (ref 3.5–5.1)
RBC # BLD: 2.98 M/CU MM (ref 4.6–6.2)
SEGMENTED NEUTROPHILS ABSOLUTE COUNT: 4.7 K/CU MM
SEGMENTED NEUTROPHILS RELATIVE PERCENT: 74.3 % (ref 36–66)
SODIUM BLD-SCNC: 136 MMOL/L (ref 135–145)
TOTAL IMMATURE NEUTOROPHIL: 0.01 K/CU MM
TOTAL NUCLEATED RBC: 0 K/CU MM
TOTAL PROTEIN: 6.3 GM/DL (ref 6.4–8.2)
WBC # BLD: 6.3 K/CU MM (ref 4–10.5)

## 2019-03-22 PROCEDURE — 85025 COMPLETE CBC W/AUTO DIFF WBC: CPT

## 2019-03-22 PROCEDURE — 74177 CT ABD & PELVIS W/CONTRAST: CPT

## 2019-03-22 PROCEDURE — 83690 ASSAY OF LIPASE: CPT

## 2019-03-22 PROCEDURE — 6360000002 HC RX W HCPCS: Performed by: EMERGENCY MEDICINE

## 2019-03-22 PROCEDURE — 99284 EMERGENCY DEPT VISIT MOD MDM: CPT

## 2019-03-22 PROCEDURE — 96374 THER/PROPH/DIAG INJ IV PUSH: CPT

## 2019-03-22 PROCEDURE — 6360000004 HC RX CONTRAST MEDICATION: Performed by: EMERGENCY MEDICINE

## 2019-03-22 PROCEDURE — 2580000003 HC RX 258: Performed by: EMERGENCY MEDICINE

## 2019-03-22 PROCEDURE — 36415 COLL VENOUS BLD VENIPUNCTURE: CPT

## 2019-03-22 PROCEDURE — 96361 HYDRATE IV INFUSION ADD-ON: CPT

## 2019-03-22 PROCEDURE — 80053 COMPREHEN METABOLIC PANEL: CPT

## 2019-03-22 RX ORDER — ONDANSETRON 2 MG/ML
4 INJECTION INTRAMUSCULAR; INTRAVENOUS EVERY 30 MIN PRN
Status: DISCONTINUED | OUTPATIENT
Start: 2019-03-22 | End: 2019-03-22 | Stop reason: HOSPADM

## 2019-03-22 RX ORDER — DICYCLOMINE HYDROCHLORIDE 10 MG/1
10 CAPSULE ORAL 3 TIMES DAILY
Qty: 15 CAPSULE | Refills: 3 | Status: SHIPPED | OUTPATIENT
Start: 2019-03-22 | End: 2019-06-27

## 2019-03-22 RX ORDER — 0.9 % SODIUM CHLORIDE 0.9 %
500 INTRAVENOUS SOLUTION INTRAVENOUS ONCE
Status: COMPLETED | OUTPATIENT
Start: 2019-03-22 | End: 2019-03-22

## 2019-03-22 RX ADMIN — IOPAMIDOL 80 ML: 755 INJECTION, SOLUTION INTRAVENOUS at 10:28

## 2019-03-22 RX ADMIN — ONDANSETRON 4 MG: 2 INJECTION INTRAMUSCULAR; INTRAVENOUS at 09:47

## 2019-03-22 RX ADMIN — SODIUM CHLORIDE 500 ML: 9 INJECTION, SOLUTION INTRAVENOUS at 09:47

## 2019-03-22 ASSESSMENT — PAIN DESCRIPTION - PAIN TYPE: TYPE: ACUTE PAIN

## 2019-03-22 ASSESSMENT — PAIN DESCRIPTION - LOCATION: LOCATION: ABDOMEN

## 2019-03-22 ASSESSMENT — PAIN SCALES - GENERAL: PAINLEVEL_OUTOF10: 2

## 2019-04-09 ENCOUNTER — TELEPHONE (OUTPATIENT)
Dept: CARDIOLOGY CLINIC | Age: 84
End: 2019-04-09

## 2019-04-09 NOTE — LETTER
[facility] [address] [facility],Third Floor  Phone: [phone]    Fax [phone]                  Tarun Vaca MD, Mair Allen MD, [address] MD Oz, MD Pedro Londono MD Sandor Anes, MD MALLARD FILLMORE GATES APRN-ANGEL Palomares APRN-CNP    Cardiac Risk Assessment     4/10/2019        Surgery Date: 4/24/19  Surgery: Colonoscopy  Anesthesia Type: Propofol  Fax Number: [phone]    To: Dr. Reji Perez  From : HOMER Mcneil CNP    Patient Name: Teresa Roberts     YOB: 1927    Teresa Roberts was evaluated from a cardiac standpoint for his surgery / procedure and based on his history, diagnosis, recent cardiac testing, he is considered a moderate risk candidate for any axel-operative cardiac complications. Antiplatelet therapy can be held prior to the surgery at the discretion of the surgeon to be resumed as soon as possible if held. Please call with any further questions.     Respectfully,     ANGEL Mcneil  TB/cjs

## 2019-05-06 ENCOUNTER — PROCEDURE VISIT (OUTPATIENT)
Dept: CARDIOLOGY CLINIC | Age: 84
End: 2019-05-06
Payer: MEDICARE

## 2019-05-06 DIAGNOSIS — Z95.0 CARDIAC PACEMAKER IN SITU: Primary | ICD-10-CM

## 2019-05-06 DIAGNOSIS — I44.30 AV BLOCK: ICD-10-CM

## 2019-05-06 DIAGNOSIS — I49.5 SINUS NODE DYSFUNCTION (HCC): ICD-10-CM

## 2019-05-06 PROCEDURE — 93296 REM INTERROG EVL PM/IDS: CPT | Performed by: INTERNAL MEDICINE

## 2019-05-06 PROCEDURE — 93294 REM INTERROG EVL PM/LDLS PM: CPT | Performed by: INTERNAL MEDICINE

## 2019-05-24 ENCOUNTER — HOSPITAL ENCOUNTER (EMERGENCY)
Age: 84
Discharge: HOME | End: 2019-05-24
Attending: EMERGENCY MEDICINE
Payer: MEDICARE

## 2019-05-24 VITALS
OXYGEN SATURATION: 100 % | HEIGHT: 66 IN | DIASTOLIC BLOOD PRESSURE: 59 MMHG | BODY MASS INDEX: 32.14 KG/M2 | RESPIRATION RATE: 16 BRPM | HEART RATE: 90 BPM | TEMPERATURE: 98.2 F | WEIGHT: 200 LBS | SYSTOLIC BLOOD PRESSURE: 120 MMHG

## 2019-05-24 DIAGNOSIS — R45.0 NERVOUS: Primary | ICD-10-CM

## 2019-05-24 LAB
ALBUMIN SERPL-MCNC: 3.7 GM/DL (ref 3.4–5)
ALP BLD-CCNC: 67 IU/L (ref 40–129)
ALT SERPL-CCNC: 9 U/L (ref 10–40)
ANION GAP SERPL CALCULATED.3IONS-SCNC: 10 MMOL/L (ref 4–16)
AST SERPL-CCNC: 20 IU/L (ref 15–37)
BASOPHILS ABSOLUTE: 0.1 K/CU MM
BASOPHILS RELATIVE PERCENT: 0.9 % (ref 0–1)
BILIRUB SERPL-MCNC: 0.5 MG/DL (ref 0–1)
BUN BLDV-MCNC: 20 MG/DL (ref 6–23)
CALCIUM SERPL-MCNC: 8.9 MG/DL (ref 8.3–10.6)
CHLORIDE BLD-SCNC: 100 MMOL/L (ref 99–110)
CO2: 28 MMOL/L (ref 21–32)
CREAT SERPL-MCNC: 1.2 MG/DL (ref 0.9–1.3)
DIFFERENTIAL TYPE: ABNORMAL
EOSINOPHILS ABSOLUTE: 0.1 K/CU MM
EOSINOPHILS RELATIVE PERCENT: 2 % (ref 0–3)
GFR AFRICAN AMERICAN: >60 ML/MIN/1.73M2
GFR NON-AFRICAN AMERICAN: 57 ML/MIN/1.73M2
GLUCOSE BLD-MCNC: 106 MG/DL
GLUCOSE BLD-MCNC: 106 MG/DL (ref 70–99)
GLUCOSE BLD-MCNC: 108 MG/DL (ref 70–99)
HCT VFR BLD CALC: 33.7 % (ref 42–52)
HEMOGLOBIN: 10.6 GM/DL (ref 13.5–18)
IMMATURE NEUTROPHIL %: 0.2 % (ref 0–0.43)
LYMPHOCYTES ABSOLUTE: 0.9 K/CU MM
LYMPHOCYTES RELATIVE PERCENT: 16.5 % (ref 24–44)
MCH RBC QN AUTO: 32.9 PG (ref 27–31)
MCHC RBC AUTO-ENTMCNC: 31.5 % (ref 32–36)
MCV RBC AUTO: 104.7 FL (ref 78–100)
MONOCYTES ABSOLUTE: 0.5 K/CU MM
MONOCYTES RELATIVE PERCENT: 8.9 % (ref 0–4)
NUCLEATED RBC %: 0 %
PDW BLD-RTO: 13.5 % (ref 11.7–14.9)
PLATELET # BLD: 132 K/CU MM (ref 140–440)
PMV BLD AUTO: 9 FL (ref 7.5–11.1)
POTASSIUM SERPL-SCNC: 4 MMOL/L (ref 3.5–5.1)
RBC # BLD: 3.22 M/CU MM (ref 4.6–6.2)
SEGMENTED NEUTROPHILS ABSOLUTE COUNT: 4 K/CU MM
SEGMENTED NEUTROPHILS RELATIVE PERCENT: 71.5 % (ref 36–66)
SODIUM BLD-SCNC: 138 MMOL/L (ref 135–145)
TOTAL IMMATURE NEUTOROPHIL: 0.01 K/CU MM
TOTAL NUCLEATED RBC: 0 K/CU MM
TOTAL PROTEIN: 6.6 GM/DL (ref 6.4–8.2)
WBC # BLD: 5.6 K/CU MM (ref 4–10.5)

## 2019-05-24 PROCEDURE — 80053 COMPREHEN METABOLIC PANEL: CPT

## 2019-05-24 PROCEDURE — 93005 ELECTROCARDIOGRAM TRACING: CPT | Performed by: EMERGENCY MEDICINE

## 2019-05-24 PROCEDURE — 99285 EMERGENCY DEPT VISIT HI MDM: CPT

## 2019-05-24 PROCEDURE — 85025 COMPLETE CBC W/AUTO DIFF WBC: CPT

## 2019-05-24 PROCEDURE — 93010 ELECTROCARDIOGRAM REPORT: CPT | Performed by: INTERNAL MEDICINE

## 2019-05-24 PROCEDURE — 82962 GLUCOSE BLOOD TEST: CPT

## 2019-05-24 ASSESSMENT — ENCOUNTER SYMPTOMS
BACK PAIN: 0
SORE THROAT: 0
RHINORRHEA: 0
SHORTNESS OF BREATH: 0
DIARRHEA: 1
NAUSEA: 0
ABDOMINAL PAIN: 0
EYE REDNESS: 0
VOMITING: 0
COUGH: 0

## 2019-05-24 NOTE — ED NOTES
Pt ambulated to [facility] with steady gait. This nurse arranged transport with [address] who states they will be able to  patient between [id number]. Patient, [address] desk RN and Charge RN made aware.       Tenzin Kaplan RN  05/24/19 1778

## 2019-05-24 NOTE — ED NOTES
Dr Luis Leal at bedside.       Yordan Mixon, [facility]  05/24/19 6310

## 2019-05-24 NOTE — ED NOTES
Blood glucose checked and reading 106. Dr. Aj Segura made aware and patient okay for discharge.       Linda Monteiro RN  05/24/19 0116

## 2019-05-24 NOTE — ED NOTES
Bed: ED-25  Expected date:   Expected time:   Means of arrival:   Comments:  Vince Russ  05/24/19 0710

## 2019-05-24 NOTE — ED NOTES
Severo Lek is an alert and oriented male who came to ED via EMS from the [facility] license of [facility] due to complaints of low blood glucose level of 72. EMS reportedly obtained a glucose level of 109. Pt reporting very vague symptoms and states, \"I just felt a little nervous. \" Pt denies nausea, vomiting, diarrhea, chest pain, shortness of breath, or abdominal pain. Pt denies urinary symptoms. Pt reports he has been taking his home medications as prescribed. Reports he did not eat much at dinner time yesterday and has not eaten breakfast. Pt has bilateral lower extremity swelling but patient states, \"I have had the swelling. \" Pt history of dizziness, PAD, essential HTN, type 2 diabetes, CABG, palpitations, pacemaker, CAD, arthritis and asthma.       Howard Newman RN  05/24/19 [id number]

## 2019-05-24 NOTE — ED PROVIDER NOTES
Triage Chief Complaint:   Fatigue and Hypoglycemia (BG of 72, medics got BG of 109)    [place]:  Yamile Buckley is a 80 y.o. male that presents with concern for his blood pressure being lower than normal this morning. He states his blood sugar was 72 at home and it is normally 110-120 and then increases after he eat breakfast.  He states he felt nervous this morning but denies any nausea or diaphoresis. No chest pain or shortness of breath. No fevers. He states that he was fed last night for dinner was not good and he did not eat much of it but denies any other changes in his eating habits. ROS:   Review of Systems   Constitutional: Negative for chills, diaphoresis and fever. HENT: Negative for congestion, rhinorrhea and sore throat. Eyes: Negative for redness and visual disturbance. Respiratory: Negative for cough and shortness of breath. Cardiovascular: Negative for chest pain and leg swelling. Gastrointestinal: Positive for diarrhea (chronic). Negative for abdominal pain, nausea and vomiting. Genitourinary: Negative for dysuria and frequency. Musculoskeletal: Negative for arthralgias and back pain. Skin: Negative for rash and wound. Neurological: Negative for syncope and headaches. Psychiatric/Behavioral: Negative for hallucinations and suicidal ideas. The patient is nervous/anxious. Past Medical History:   Diagnosis Date    Acid reflux     Arthritis     Asthma     \"As A Child, No Problems\"    Back problem     \"Back Spasms Sometimes\"    CAD (coronary artery disease)     Sees Dr. Carmelo Leblanc    Diabetes mellitus [facility]) Dx 1980's    Full dentures     H/O 24 hour EKG monitoring 05/22/2014    14 DAY EVENT MONITOR    H/O Doppler ultrasound 10/25/11    <50% carotid disease bilaterally    H/O echocardiogram 10/11    EF 62%    H/O echocardiogram 11/24/14    Significant for aortic root and left arterial enlargement. Normal LV systolic but abnormal diastolic function.  Mild tricuspid and aortic insufficiencies. Minimum pulmonary HTN.  Hiatal hernia     History of cardiovascular stress test     12/13 Normal EF52%, 10/11 moderate inferior wall myocardial ischemia    History of Doppler echocardiogram 11/01/2017    Normal left ventricle structure and systolic function. Ejection fraction is visually estimated at 55-60%. Grade I diastolic dysfunction. Sclerotic, but non-stenotic aortic valve. Mild aortic regurgitation with pressure half time of 656 msec. Mildly elevated pulmonary artery pressure. Dilated aortic root at 4.2 cm. No evidence of pericardial effusion.     History of Holter monitoring 5/22/14    Event Monitor: Paced rhythm with PACs    HX OTHER MEDICAL     Primary Care Physician Is Dr. Isidra Diaz     Resident At [address] [place] [address]     Hypertension     Kidney stones 1980's    \"Passed Kidney Stones\"    Neuropathy     \"Both Feet\"    Nocturia     [facility]ox[facility] fibrillation[facility])     Palpitations 5/14    PVD (peripheral vascular disease) ([facility] [address] 2/11    mild    S/P CABG x 3 1998    [place] S/P left heart catheterization by percutaneous approach 6/28/10    patency of 2 grafts with possile occlusion of third    S/P placement of cardiac pacemaker 8-7-98, 1-7-11    Medtronic Hung REEVES Pacemaker Implanted    Slow urinary stream     Staph infection \"Twice In 2001\"    \"Left Knee After Surgery\"    Wears glasses      Past Surgical History:   Procedure Laterality Date    CARDIAC PACEMAKER PLACEMENT  8-7-98    Nirav Persaud DR Pacemaker Implanted    CARDIAC PACEMAKER PLACEMENT  1-7-11    Medtronic Hung REEVES Pacemaker Implanted   [address]    CABG ([address] Bypasses)    CHOLECYSTECTOMY, LAPAROSCOPIC  1999    COLONOSCOPY  \"Several\"    \"Removed Polyps Once\"    DENTAL SURGERY      All Teeth Extracted In Past    INGUINAL HERNIA REPAIR Left 1940    [address] Right 1980's    JOINT REPLACEMENT Left 2001 Total Left Knee    JOINT REPLACEMENT Left     \"Had Staph Infection , Took Implant Out, Replaced It After 8 Months\"    OTHER SURGICAL HISTORY Left 13    left spermatocelectomy, left scrotal exploration    PACEMAKER PLACEMENT  98    Nirav Persaud DR Pacemaker Implanted    PACEMAKER PLACEMENT  11    Nirav Persaud DR Pacemaker Implanted    PACEMAKER PLACEMENT Left 2018    previous device explanted, Medtronic Adapta ADD R01 implanted.      TONSILLECTOMY  's     Family History   Problem Relation Age of Onset   [facility] Stroke Mother     Diabetes Mother     Cancer Mother         \"Breast Cancer\"    Stroke Father     High Blood Pressure Father     Arthritis Sister     Heart Disease Brother     Other Daughter         \"Grossly Overweight\"    Other Daughter         \"Sleep Apnea\"    Arthritis Son      Social History     Socioeconomic History    Marital status:      Spouse name: Not on file    Number of children: Not on file    Years of education: Not on file    Highest education level: Not on file   Occupational History    Occupation: retired   Social Needs    Financial resource strain: Not on file    Food insecurity:     Worry: Not on file     Inability: Not on file   WTFast needs:     Medical: Not on file     Non-medical: Not on file   Tobacco Use    Smoking status: Former Smoker     Packs/day: 1.00     Years: 35.00     Pack years: 35.00     Start date: 1946     Last attempt to quit: 1981     Years since quittin.4    Smokeless tobacco: Former User     Quit date: 1981   Substance and Sexual Activity    Alcohol use: No     Alcohol/week: 0.0 oz    Drug use: No    Sexual activity: Never     Comment:    Lifestyle    Physical activity:     Days per week: Not on file     Minutes per session: Not on file    Stress: Not on file   Relationships    Social connections:     Talks on phone: Not on file     Gets together: Not on file MG tablet Take 1 tablet by mouth 2 times daily 180 tablet 2    insulin glargine (LANTUS) 100 UNIT/ML injection vial Inject 20 Units into the skin nightly 10 vial 2    tamsulosin (FLOMAX) 0.4 MG capsule Take 1 capsule by mouth daily 90 capsule 2    Cholecalciferol (VITAMIN D-3) 1000 UNITS CAPS Take 2,000 Units by mouth daily 180 capsule 2    simvastatin (ZOCOR) 20 MG tablet Take 1 tablet by mouth nightly 90 tablet 2     Allergies   Allergen Reactions    Pcn [Penicillins] Hives    Terramycin [Oxytetracycline] Hives    Tetracyclines & Related Hives       Nursing Notes Reviewed     Physical Exam:   ED Triage Vitals   Enc Vitals Group      BP       Pulse       Resp       Temp       Temp src       SpO2       Weight       Height       Head Circumference       Peak Flow       Pain Score       Pain Loc       Pain Edu? Excl. in [address]? BP (!) 120/59   Pulse 90   Temp 98.2 °F (36.8 °C) (Oral)   Resp 16   Ht 5' 6\" (1.676 m)   Wt 200 lb (90.7 kg)   SpO2 100%   BMI 32.28 kg/m²   My pulse ox interpretation is - normal  Physical Exam   Constitutional: He appears well-developed and well-nourished. No distress. HENT:   Head: Normocephalic and atraumatic. Eyes: Conjunctivae are normal. Right eye exhibits no discharge. Left eye exhibits no discharge. Cardiovascular: Normal rate and regular rhythm. Pulmonary/Chest: Effort normal and breath sounds normal. No respiratory distress. Abdominal: Soft. He exhibits no distension. There is no tenderness. Musculoskeletal: Normal range of motion. He exhibits no deformity. Neurological: He is alert. No cranial nerve deficit. Skin: Skin is warm and dry. He is not diaphoretic. Psychiatric: He has a normal mood and affect.  His behavior is normal.       I have reviewed and interpreted all of the currently available lab results from this visit (if applicable):  Results for orders placed or performed during the hospital encounter of 05/24/19   CBC Auto Differential Result Value Ref Range    WBC 5.6 4.0 - 10.5 K/CU MM    RBC 3.22 (L) 4.6 - 6.2 M/CU MM    Hemoglobin 10.6 (L) 13.5 - 18.0 GM/DL    Hematocrit 33.7 (L) 42 - 52 %    .7 (H) 78 - 100 FL    MCH 32.9 (H) 27 - 31 PG    MCHC 31.5 (L) 32.0 - 36.0 %    RDW 13.5 11.7 - 14.9 %    Platelets 446 (L) 626 - 440 K/CU MM    MPV 9.0 7.5 - 11.1 FL    Differential Type AUTOMATED DIFFERENTIAL     Segs Relative 71.5 (H) 36 - 66 %    Lymphocytes % 16.5 (L) 24 - 44 %    Monocytes % 8.9 (H) 0 - 4 %    Eosinophils % 2.0 0 - 3 %    Basophils % 0.9 0 - 1 %    Segs Absolute 4.0 K/CU MM    Lymphocytes # 0.9 K/CU MM    Monocytes # 0.5 K/CU MM    Eosinophils # 0.1 K/CU MM    Basophils # 0.1 K/CU MM    Nucleated RBC % 0.0 %    Total Nucleated RBC 0.0 K/CU MM    Total Immature Neutrophil 0.01 K/CU MM    Immature Neutrophil % 0.2 0 - 0.43 %   Comprehensive Metabolic Panel w/ Reflex to MG   Result Value Ref Range    Sodium 138 135 - 145 MMOL/L    Potassium 4.0 3.5 - 5.1 MMOL/L    Chloride 100 99 - 110 mMol/L    CO2 28 21 - 32 MMOL/L    BUN 20 6 - 23 MG/DL    CREATININE 1.2 0.9 - 1.3 MG/DL    Glucose 108 (H) 70 - 99 MG/DL    Calcium 8.9 8.3 - 10.6 MG/DL    Alb 3.7 3.4 - 5.0 GM/DL    Total Protein 6.6 6.4 - 8.2 GM/DL    Total Bilirubin 0.5 0.0 - 1.0 MG/DL    ALT 9 (L) 10 - 40 U/L    AST 20 15 - 37 IU/L    Alkaline Phosphatase 67 40 - 129 IU/L    GFR Non- 57 (L) >60 mL/min/1.73m2    GFR African American >60 >60 mL/min/1.73m2    Anion Gap 10 4 - 16   POC Blood Glucose   Result Value Ref Range    Glucose 106 mg/dL   POCT Glucose   Result Value Ref Range    POC Glucose 106 (H) 70 - 99 MG/DL   EKG 12 Lead   Result Value Ref Range    Ventricular Rate 131 BPM    Atrial Rate 131 BPM    QRS Duration 38 ms    Q-T Interval 178 ms    QTc Calculation (Bazett) 262 ms    P Axis 79 degrees    R Axis -19 degrees    T Axis -52 degrees    Diagnosis       Ventricular-paced rhythm in a pattern of bigeminy  Abnormal ECG  When compared with ECG of 09-MAR-2019 04:15,  Vent. rate has increased BY  28 BPM        Radiographs (if obtained):  [] The following radiograph was interpreted by myself in the absence of a radiologist:  [x]Radiologist's Report Reviewed:  No orders to display         EKG (if obtained): (All EKG's are interpreted by myself in the absence of a cardiologist)  Arterial and ventricular paced rhythm. Rate of 65. , . No ST elevations or depressions. Nonspecific T waves. Impression: Abnormal EKG. When compared to previous EKG from 3/9/19, there are no significant changes. MDM:  Differential diagnoses considered include hypoglycemia, anxiety, low suspicion for cardiac arrhythmia. He    EKG shows paced rhythm which is unchanged from previous. I'm not concerned for cardiac arrhythmia. Patient's glucose level from medics was in the 100s. Basic labs are obtained. Showed normal glucose level and other electrolytes are within normal limits and at patient's baseline. Throughout the stay in the emergency department patient continued to be asymptomatic and no longer has any nervousness. After period of observation patient's repeat glucose is 106. On reevaluation he is asymptomatic. I suspect his glucose level was decreased this morning due to not eating as much for dinner last night because he did not like the food. That if this happens in the future that he should have a snack at home and then recheck his glucose level. I also encouraged close follow-up with his primary care physician. We'll discharge him home in stable condition. Plan of care explained to patient. Concerning signs and symptoms warranting a return visit to the Emergency Department were explained in detail. All questions and concerns were addressed to the patient's satisfaction. Patient understood and agreed with plan. The likelihood of other entities in the differential is insufficient to justify any further testing for them.  This was explained to the patient. The patient was advised that persistent or worsening symptoms would requirefurther evaluation. Clinical Impression:  1. Nervous          Hipolito Dancer, MD       Please note that portions of this note may have been complete with a voice recognition program.  Effortswere made to edit the dictations, but occasional words are mis-transcribed.           Hipolito Dancer, MD  05/24/19 1171

## 2019-05-28 ENCOUNTER — TELEPHONE (OUTPATIENT)
Dept: CARDIOLOGY CLINIC | Age: 84
End: 2019-05-28

## 2019-05-28 NOTE — TELEPHONE ENCOUNTER
Rescheduled appointment with patients daughter.  Also they were okay to see Aneesh Fajardo on 6/12 @ 4 pm.

## 2019-05-30 LAB
EKG ATRIAL RATE: 131 BPM
EKG DIAGNOSIS: NORMAL
EKG P AXIS: 79 DEGREES
EKG Q-T INTERVAL: 178 MS
EKG QRS DURATION: 38 MS
EKG QTC CALCULATION (BAZETT): 262 MS
EKG R AXIS: -19 DEGREES
EKG T AXIS: -52 DEGREES
EKG VENTRICULAR RATE: 131 BPM

## 2019-06-12 ENCOUNTER — OFFICE VISIT (OUTPATIENT)
Dept: CARDIOLOGY CLINIC | Age: 84
End: 2019-06-12
Payer: MEDICARE

## 2019-06-12 VITALS
DIASTOLIC BLOOD PRESSURE: 60 MMHG | HEART RATE: 72 BPM | SYSTOLIC BLOOD PRESSURE: 112 MMHG | BODY MASS INDEX: 32.3 KG/M2 | HEIGHT: 66 IN | WEIGHT: 201 LBS

## 2019-06-12 DIAGNOSIS — T82.110A PACEMAKER LEAD MALFUNCTION, INITIAL ENCOUNTER: Primary | ICD-10-CM

## 2019-06-12 PROCEDURE — G8427 DOCREV CUR MEDS BY ELIG CLIN: HCPCS | Performed by: INTERNAL MEDICINE

## 2019-06-12 PROCEDURE — G8417 CALC BMI ABV UP PARAM F/U: HCPCS | Performed by: INTERNAL MEDICINE

## 2019-06-12 PROCEDURE — 99212 OFFICE O/P EST SF 10 MIN: CPT | Performed by: INTERNAL MEDICINE

## 2019-06-12 PROCEDURE — G8598 ASA/ANTIPLAT THER USED: HCPCS | Performed by: INTERNAL MEDICINE

## 2019-06-12 PROCEDURE — 1123F ACP DISCUSS/DSCN MKR DOCD: CPT | Performed by: INTERNAL MEDICINE

## 2019-06-12 PROCEDURE — 4040F PNEUMOC VAC/ADMIN/RCVD: CPT | Performed by: INTERNAL MEDICINE

## 2019-06-12 PROCEDURE — 1036F TOBACCO NON-USER: CPT | Performed by: INTERNAL MEDICINE

## 2019-06-12 RX ORDER — MONTELUKAST SODIUM 4 MG/1
TABLET, CHEWABLE ORAL
Refills: 2 | COMMUNITY
Start: 2019-05-14 | End: 2019-06-27

## 2019-06-12 NOTE — PROGRESS NOTES
Susanna Ny is a 80 y.o. male who has Pacemaker    CHIEF COMPLAINT  : Here for  Pacemaker check up                  HPI:     Patient with complete heart block sp pacemaker here for pacemaker check up     Patient has history of CAD, HTN & Dyslipidemia problems. He does not have any complaints at this time. Over all feels good. Reji Cannon has the following history recorded in care path:  Patient Active Problem List    Diagnosis Date Noted    Dizziness      Priority: High    P[facility]peripheral artery [facility]) ([facility] [address]      Priority: High    Pacemaker at end of battery life     Coronary artery disease involving coronary bypass graft of native heart without angina pectoris 01/11/2017    Benign prostatic hyperplasia without lower urinary tract symptoms 01/11/2017    PA[facility]oxysmal [facility] fibrillation) ([facility])     S/P placement of cardiac pacemaker     Palpitations     Other specified disorder of male genital organs(608.89) 12/18/2013    Essential hypertension     Type 2 diabetes mellitus with circulatory disorder, with long-term current use of insulin ([facility])     Pure hypercholesterolemia     S/P CABG x 3      Current Outpatient Medications   Medication Sig Dispense Refill    dicyclomine (BENTYL) 10 MG capsule Take 1 capsule by mouth 3 times daily As needed for abdominal pain 15 capsule 3    albuterol sulfate HFA (PROVENTIL HFA) 108 (90 Base) MCG/ACT inhaler Inhale 2 puffs into the lungs every 4 hours as needed for Wheezing or Shortness of Breath With spacer (and mask if indicated). Thanks.  1 Inhaler 1    Spacer/Aero-Holding Chambers (E-Z SPACER) ALEXUS 1 Device by Does not apply route daily as needed (for cough) 1 Device 0    docusate sodium (COLACE) 100 MG capsule Take 1 capsule by mouth 2 times daily 30 capsule 0    pioglitazone (ACTOS) 30 MG tablet Take 30 mg by mouth daily       metoprolol tartrate (LOPRESSOR) 25 MG tablet Take 1 tablet by mouth 2 times daily 60 tablet 5    glipiZIDE (GLUCOTROL) 5 MG tablet Take 2 tablets by mouth 2 times daily (before meals) 360 tablet 1    lisinopril (PRINIVIL;ZESTRIL) 5 MG tablet Take 1 tablet by mouth daily (Patient taking differently: Take 2.5 mg by mouth daily ) 90 tablet 1    Insulin Pen Needle (PEN NEEDLES 3/16\") 31G X 5 MM MISC 1 each by Does not apply route daily 100 each 3    gabapentin (NEURONTIN) 300 MG capsule Take 1 capsule by mouth nightly 270 capsule 2    apixaban (ELIQUIS) 2.5 MG TABS tablet Take 1 tablet by mouth 2 times daily 180 tablet 0    cilostazol (PLETAL) 100 MG tablet Take 1 tablet by mouth 2 times daily 180 tablet 2    insulin glargine (LANTUS) 100 UNIT/ML injection vial Inject 20 Units into the skin nightly 10 vial 2    tamsulosin (FLOMAX) 0.4 MG capsule Take 1 capsule by mouth daily 90 capsule 2    Cholecalciferol (VITAMIN D-3) 1000 UNITS CAPS Take 2,000 Units by mouth daily 180 capsule 2    simvastatin (ZOCOR) 20 MG tablet Take 1 tablet by mouth nightly 90 tablet 2    colestipol (COLESTID) 1 g tablet TAKE 2 TABLETS BY MOUTH TWO TIMES A DAY  2     No current facility-administered medications for this visit. Allergies: Pcn [penicillins]; Terramycin [oxytetracycline]; and Tetracyclines & related  Past Medical History:   Diagnosis Date    Acid reflux     Arthritis     Asthma     \"As A Child, No Problems\"    Back problem     \"Back Spasms Sometimes\"    CAD (coronary artery disease)     Sees Dr. Jose Cruz Murray    Diabetes mellitus [facility]) Dx 1980's    Full dentures     H/O 24 hour EKG monitoring 05/22/2014    14 DAY EVENT MONITOR    H/O Doppler ultrasound 10/25/11    <50% carotid disease bilaterally    H/O echocardiogram 10/11    EF 62%    H/O echocardiogram 11/24/14    Significant for aortic root and left arterial enlargement. Normal LV systolic but abnormal diastolic function. Mild tricuspid and aortic insufficiencies. Minimum pulmonary HTN.     Hiatal hernia     History of cardiovascular stress test     12/13 Normal EF52%, 10/11 moderate inferior wall myocardial ischemia    History of Doppler echocardiogram 11/01/2017    Normal left ventricle structure and systolic function. Ejection fraction is visually estimated at 55-60%. Grade I diastolic dysfunction. Sclerotic, but non-stenotic aortic valve. Mild aortic regurgitation with pressure half time of 656 msec. Mildly elevated pulmonary artery pressure. Dilated aortic root at 4.2 cm. No evidence of pericardial effusion.     History of Holter monitoring 5/22/14    Event Monitor: Paced rhythm with PACs    HX OTHER MEDICAL     Primary Care Physician Is Dr. Jean Claude Pérez     Resident At [address] [place] [address]     Hypertension     Kidney stones 1980's    \"Passed Kidney Stones\"    Neuropathy     \"Both Feet\"    Nocturia     PAF ([facility]oxysmal [facility] fibrillation) ([facility])     Palpitations 5/14    PVD (peripheral vascular disease) ([facility] [address] 2/11    mild    S/P CABG x 3 1998    Done In [place] S/P left heart catheterization by percutaneous approach 6/28/10    patency of 2 grafts with possile occlusion of third    S/P placement of cardiac pacemaker 8-7-98, 1-7-11    Medtronic Hung REEVES Pacemaker Implanted    Slow urinary stream     Staph infection \"Twice In 2001\"    \"Left Knee After Surgery\"    Wears glasses      Past Surgical History:   Procedure Laterality Date    CARDIAC PACEMAKER PLACEMENT  8-7-98    Nirav Persaud DR Pacemaker Implanted    CARDIAC PACEMAKER PLACEMENT  1-7-11    Medtronic Hung REEVES Pacemaker Implanted   [address]    CABG ([address])    CHOLECYSTECTOMY, LAPAROSCOPIC  1999    COLONOSCOPY  \"Several\"    \"Removed Polyps Once\"    DENTAL SURGERY      All Teeth Extracted In Past    INGUINAL HERNIA REPAIR Left 1940   [facility] Right 1980's    JOINT REPLACEMENT Left 2001    Total Left Knee    JOINT REPLACEMENT Left 2001    \"Had Staph Infection , Took Implant Out, Replaced It After 8 Months\"    OTHER SURGICAL HISTORY Left 13    left spermatocelectomy, left scrotal exploration    PACEMAKER PLACEMENT  98    Nirav Persaud DR Pacemaker Implanted    PACEMAKER PLACEMENT  11    Nirav Persaud DR Pacemaker Implanted    PACEMAKER PLACEMENT Left 2018    previous device explanted, Medtronic Adaptalma ADD R01 implanted.  TONSILLECTOMY  0's      As reviewed   Family History   Problem Relation Age of Onset   Suze Valles Stroke Mother     Diabetes Mother     Cancer Mother         \"Breast Cancer\"    Stroke Father     High Blood Pressure Father     Arthritis Sister     Heart Disease Brother     Other Daughter         \"Grossly Overweight\"    Other Daughter         \"Sleep Apnea\"    Arthritis Son      Social History     Tobacco Use    Smoking status: Former Smoker     Packs/day: 1.00     Years: 35.00     Pack years: 35.00     Start date: 1946     Last attempt to quit: 1981     Years since quittin.4    Smokeless tobacco: Former User     Quit date: 1981   Substance Use Topics    Alcohol use: No     Alcohol/week: 0.0 oz      Review of Systems:    Constitutional: Negative for diaphoresis and fatigue  Psychological:Negative for anxiety or depression  HENT: Negative for headaches, nasal congestion, sinus pain or vertigo  Eyes: Negative for visual disturbance.    Endocrine: Negative for polydipsia/polyuria  Respiratory: Negative for shortness of breath  Cardiovascular: Negative for chest pain, dyspnea on exertion, claudication, edema, irregular heartbeat, murmur, palpitations or shortness of breath  Gastrointestinal: Negative for abdominal pain or heartburn  Genito-Urinary: Negative for urinary frequency/urgency  Musculoskeletal: Negative for muscle pain, muscular weakness, negative for pain in arm and leg or swelling in foot and leg  Neurological: Negative for dizziness, headaches, memory loss, numbness/tingling, visual changes,

## 2019-06-27 ENCOUNTER — OFFICE VISIT (OUTPATIENT)
Dept: CARDIOLOGY CLINIC | Age: 84
End: 2019-06-27
Payer: MEDICARE

## 2019-06-27 VITALS
RESPIRATION RATE: 16 BRPM | HEIGHT: 66 IN | DIASTOLIC BLOOD PRESSURE: 68 MMHG | HEART RATE: 76 BPM | BODY MASS INDEX: 32.08 KG/M2 | WEIGHT: 199.6 LBS | SYSTOLIC BLOOD PRESSURE: 122 MMHG

## 2019-06-27 DIAGNOSIS — Z95.0 S/P PLACEMENT OF CARDIAC PACEMAKER: ICD-10-CM

## 2019-06-27 DIAGNOSIS — I10 ESSENTIAL HYPERTENSION: ICD-10-CM

## 2019-06-27 DIAGNOSIS — E78.00 PURE HYPERCHOLESTEROLEMIA: ICD-10-CM

## 2019-06-27 DIAGNOSIS — I73.9 PAD (PERIPHERAL ARTERY DISEASE) (HCC): Primary | ICD-10-CM

## 2019-06-27 DIAGNOSIS — I48.0 PAF (PAROXYSMAL ATRIAL FIBRILLATION) (HCC): ICD-10-CM

## 2019-06-27 DIAGNOSIS — I25.810 CORONARY ARTERY DISEASE INVOLVING CORONARY BYPASS GRAFT OF NATIVE HEART WITHOUT ANGINA PECTORIS: ICD-10-CM

## 2019-06-27 PROCEDURE — 1123F ACP DISCUSS/DSCN MKR DOCD: CPT | Performed by: NURSE PRACTITIONER

## 2019-06-27 PROCEDURE — G8427 DOCREV CUR MEDS BY ELIG CLIN: HCPCS | Performed by: NURSE PRACTITIONER

## 2019-06-27 PROCEDURE — 99214 OFFICE O/P EST MOD 30 MIN: CPT | Performed by: NURSE PRACTITIONER

## 2019-06-27 PROCEDURE — G8417 CALC BMI ABV UP PARAM F/U: HCPCS | Performed by: NURSE PRACTITIONER

## 2019-06-27 PROCEDURE — 4040F PNEUMOC VAC/ADMIN/RCVD: CPT | Performed by: NURSE PRACTITIONER

## 2019-06-27 PROCEDURE — G8598 ASA/ANTIPLAT THER USED: HCPCS | Performed by: NURSE PRACTITIONER

## 2019-06-27 PROCEDURE — 1036F TOBACCO NON-USER: CPT | Performed by: NURSE PRACTITIONER

## 2019-06-27 RX ORDER — SULFAMETHOXAZOLE AND TRIMETHOPRIM 800; 160 MG/1; MG/1
1 TABLET ORAL DAILY
COMMUNITY
End: 2019-12-11

## 2019-06-27 RX ORDER — FINASTERIDE 5 MG/1
5 TABLET, FILM COATED ORAL DAILY
COMMUNITY

## 2019-06-27 NOTE — ASSESSMENT & PLAN NOTE
 Controlled   To continue Beta blocker, Calcium channel blocker, ACE, ARB, Vasodilator, Diuretic   advised low salt diet    Last echo 2017 showed Normal left ventricle structure and systolic function.   Ejection fraction is visually estimated at 55-60%.    Grade I diastolic dysfunction.   Sclerotic, but non-stenotic aortic valve.   Mild aortic regurgitation with pressure half time of 656 msec.   Mildly elevated pulmonary artery pressure.   Dilated aortic root at 4.2 cm.   No evidence of pericardial effusion.

## 2019-06-27 NOTE — PROGRESS NOTES
ANA MARÍA (CREEK) [facility] PHYSICAL REHABILITATION [place]  [address], [address] [facility],Third Floor  Phone: [phone]    Fax [phone]                  Ruiz Becker MD, Bahman Lauren MD, Vibha Patten MD, MD Kacy Liang MD Rudie Ganong, MD MALLARD FILLMORE GATES, APRN      Anuj Franco, APRN  Jeremy Solomon, APRN    CARDIOLOGY  NOTE      6/27/2019    RE: Adam Landin Early  (3/24/1927)                               TO:  Dr. Kim Prater MD     The primary cardiologist is Dr. Noman Alegria    CC:   1. PAD (peripheral artery disease) ([facility] [address]    2. Essential hypertension    3. Pure hypercholesterolemia    4. PAF (paroxysmal atrial fibrillation) ([facility] [address]    5. Coronary artery disease involving coronary bypass graft of native heart without angina pectoris    6. S/P placement of cardiac pacemaker      Patient denies all of the following:  Chest Pain  Palpitations  Shortness of Breath  Edema  Dizziness  Syncope      HPI: Thank you for involving me in taking care of your patient Severo Lek, who is a  80y.o. year old male with a history as listed above. Patient is  active and male does not walks regularly. Patient is  compliant with he medications. Patient denies any cardiac complaints or needs.      Vitals:    06/27/19 1505   BP: 122/68   Pulse: 76   Resp: 16       Current Outpatient Medications   Medication Sig Dispense Refill    sulfamethoxazole-trimethoprim (BACTRIM DS) 800-160 MG per tablet Take 1 tablet by mouth daily      finasteride (PROSCAR) 5 MG tablet Take 5 mg by mouth daily      Spacer/Aero-Holding Chambers (E-Z SPACER) ALEXUS 1 Device by Does not apply route daily as needed (for cough) 1 Device 0    pioglitazone (ACTOS) 30 MG tablet Take 30 mg by mouth daily       metoprolol tartrate (LOPRESSOR) 25 MG tablet Take 1 tablet by mouth 2 times daily 60 tablet 5    glipiZIDE (GLUCOTROL) 5 MG tablet Take 2 tablets by mouth 2 times daily (before meals) 360 tablet 1    lisinopril (PRINIVIL;ZESTRIL) 5 MG tablet Take 1 tablet by mouth daily (Patient taking differently: Take 2.5 mg by mouth daily ) 90 tablet 1    Insulin Pen Needle (PEN NEEDLES 3/16\") 31G X 5 MM MISC 1 each by Does not apply route daily 100 each 3    gabapentin (NEURONTIN) 300 MG capsule Take 1 capsule by mouth nightly 270 capsule 2    apixaban (ELIQUIS) 2.5 MG TABS tablet Take 1 tablet by mouth 2 times daily 180 tablet 0    cilostazol (PLETAL) 100 MG tablet Take 1 tablet by mouth 2 times daily 180 tablet 2    insulin glargine (LANTUS) 100 UNIT/ML injection vial Inject 20 Units into the skin nightly 10 vial 2    tamsulosin (FLOMAX) 0.4 MG capsule Take 1 capsule by mouth daily 90 capsule 2    Cholecalciferol (VITAMIN D-3) 1000 UNITS CAPS Take 2,000 Units by mouth daily 180 capsule 2    simvastatin (ZOCOR) 20 MG tablet Take 1 tablet by mouth nightly 90 tablet 2    albuterol sulfate HFA (PROVENTIL HFA) 108 (90 Base) MCG/ACT inhaler Inhale 2 puffs into the lungs every 4 hours as needed for Wheezing or Shortness of Breath With spacer (and mask if indicated). Thanks. 1 Inhaler 1     No current facility-administered medications for this visit. Allergies: Pcn [penicillins]; Terramycin [oxytetracycline]; and Tetracyclines & related  Past Medical History:   Diagnosis Date    Acid reflux     Arthritis     Asthma     \"As A Child, No Problems\"    Back problem     \"Back Spasms Sometimes\"    CAD (coronary artery disease)     Sees Dr. Dahlia Ruiz    Diabetes mellitus [facility]) Dx 1980's    Full dentures     H/O 24 hour EKG monitoring 05/22/2014    14 DAY EVENT MONITOR    H/O Doppler ultrasound 10/25/11    <50% carotid disease bilaterally    H/O echocardiogram 10/11    EF 62%    H/O echocardiogram 11/24/14    Significant for aortic root and left arterial enlargement. Normal LV systolic but abnormal diastolic function. Mild tricuspid and aortic insufficiencies. Minimum pulmonary HTN.     Hiatal hernia     History of Replaced It After 8 Months\"    OTHER SURGICAL HISTORY Left 13    left spermatocelectomy, left scrotal exploration    PACEMAKER PLACEMENT  98    Nirav Persaud DR Pacemaker Implanted    PACEMAKER PLACEMENT  11    Nirav Persaud DR Pacemaker Implanted    PACEMAKER PLACEMENT Left 2018    previous device explanted, Medtronic Adaptalma ADD R01 implanted.      TONSILLECTOMY  0's     Family History   Problem Relation Age of Onset   Darrell Mitchell Stroke Mother     Diabetes Mother     Cancer Mother         \"Breast Cancer\"    Stroke Father     High Blood Pressure Father     Arthritis Sister     Heart Disease Brother     Other Daughter         \"Grossly Overweight\"   Darrell Mitchell Other Daughter         \"Sleep Apnea\"    Arthritis Son      Social History     Tobacco Use    Smoking status: Former Smoker     Packs/day: 1.00     Years: 35.00     Pack years: 35.00     Start date: 1946     Last attempt to quit: 1981     Years since quittin.5    Smokeless tobacco: Former User     Quit date: 1981   Substance Use Topics    Alcohol use: No     Alcohol/week: 0.0 oz        Review of Systems - History obtained from the patient  General: negative for - fatigue, malaise, night sweats, weight gain  Psychological: negative for - anxiety, depression, sleep disturbances  Ophthalmic: negative for - blurry vision, loss of vision  ENT: negative for - headaches, vertigo, visual changes  Hematological and Lymphatic: negative for - bleeding problems, blood clots, bruising, fatigue or pallor  Endocrine: negative for - malaise/lethargy, palpitations, unexpected weight changes  Respiratory: negative for - cough, orthopnea, shortness of breath or tachypnea  Cardiovascular: negative for - chest pain, dyspnea on exertion, edema or palpitations  Gastrointestinal: no abdominal pain or black or bloody stools, + diarrhea every three months  Genito-Urinary: no dysuria, trouble voiding, or hematuria  Musculoskeletal: negative for - gait disturbance, pain, swelling in left ankle   Neurological: negative for - confusion, dizziness, impaired coordination/balance or numbness/tingling    Objective:      Physical Exam:  /68   Pulse 76   Resp 16   Ht 5' 6\" (1.676 m)   Wt 199 lb 9.6 oz (90.5 kg)   BMI 32.22 kg/m²   Wt Readings from Last 3 Encounters:   06/27/19 199 lb 9.6 oz (90.5 kg)   06/12/19 201 lb (91.2 kg)   05/24/19 200 lb (90.7 kg)     Body mass index is 32.22 kg/m². GENERAL - Alert, oriented, pleasant, in no apparent distress. Head unremarkable  Eyes - pupils equal and reactive to light - bilateral conjunctiva are pink: sclera are white   ENT - external ears intact, nose is intact:  tongue is midline pink and moist  Neck - Supple. No jugular venous distention noted. No carotid bruits appreciated. Cardiovascular - Normal S1 and S2:  murmur appreciated No, No gallop. Regular rate- Yes,  rhythm regular-Yes. Extremities - No cyanosis, clubbing, +1edema to lower L leg. Pulmonary - No respiratory distress. No wheezes or rales. Chest is clear  Pulses: Bilateral radial and pedal pulses normal  Abdomen -  Soft no tenderness, non distended   Musculoskeletal - Normal movement of all extremities   Neurologic - alert and oriented: There are no gross focal neurologic abnormalities.    Skin-  No rash: No echymosis   Affect- normal mood and pleasant       DATA:  Lab Results   Component Value Date    TROPONINI 0.016 05/16/2014     BNP:    Lab Results   Component Value Date    BNP 28 05/16/2014     PT/INR:  No results found for: PTINR  Lab Results   Component Value Date    LABA1C 8.2 (H) 12/19/2018    LABA1C 7.5 (H) 01/31/2017     Lab Results   Component Value Date    CHOL 105 10/07/2016    TRIG 66 10/07/2016    HDL 49 10/07/2016    LDLCALC 43 10/07/2016    LDLDIRECT 49 07/12/2016     Lab Results   Component Value Date    ALT 9 (L) 05/24/2019    AST 20 05/24/2019     TSH:  No results found for: TSH      Assessment/ Plan:     PAD (peripheral artery disease) ([facility] [address]  · Denies any symptoms. Essential hypertension   Controlled   To continue Beta blocker, Calcium channel blocker, ACE, ARB, Vasodilator, Diuretic   advised low salt diet    Last echo 2017 showed Normal left ventricle structure and systolic function.   Ejection fraction is visually estimated at 55-60%.  Grade I diastolic dysfunction.   Sclerotic, but non-stenotic aortic valve.   Mild aortic regurgitation with pressure half time of 656 msec.   Mildly elevated pulmonary artery pressure.   Dilated aortic root at 4.2 cm.   No evidence of pericardial effusion.         Pure hypercholesterolemia   Lipid panel not able to be reviewed   Lipid panel ordered    Goal is not able to be determined   Patient is on a Statin    PAF (paroxysmal atrial fibrillation) ([facility])   Rate controlled yes.  He is  on anticoagulation.  Continue anti rhythmic / rate control medications     Coronary artery disease involving coronary bypass graft of native heart without angina pectoris   Patient is not having cardiac symptoms   continue BB, ASA   Low salt, Low cholesterol diet   Last stress test 2017 Probably normal perfusion study.   Possibility of mild Inferior wall Ischemia cannot be excluded.   The observed defect is probably due to diaphragmatic attenuation.   Normal LV function & wall motion. LVEF is 65 %   Supervising physician Dr. Adelita Savage .      Recommendation   Recommendations: Schedule out patient visit to discuss results. S/P placement of cardiac pacemaker  · Patient denies any issues with pacemaker  · Continue to send interrogations per care link. · Per Dr. Makayla Hopper there is lead wire issues and the software will need to be updated, but it is not recommended at this time. · Device has 8 years remaining on battery. Patient seen, interviewed and examined. Testing was reviewed. Patient is encouraged to exercise even a brisk walk for 30 minutes at least 3 to 4 times a week. Lifestyle and risk factor modificatons discussed. Various goals are discussed and questions answered. Continue current medications. Appropriate prescriptions are addressed. Questions answered and patient verbalizes understanding. Call for any problems, questions, or concerns.     Pt is to follow up in 6 months for Cardiac management    Electronically signed by HOMER Toscano CNP on 6/27/2019 at 3:42 PM

## 2019-06-27 NOTE — ASSESSMENT & PLAN NOTE
· Patient denies any issues with pacemaker  · Continue to send interrogations per care link. · Per Dr. Olga Lidia Pearl there is lead wire issues and the software will need to be updated, but it is not recommended at this time. · Device has 8 years remaining on battery.

## 2019-06-27 NOTE — ASSESSMENT & PLAN NOTE
 Lipid panel not able to be reviewed   Lipid panel ordered    Goal is not able to be determined   Patient is on a Statin

## 2019-06-27 NOTE — ASSESSMENT & PLAN NOTE
 Rate controlled yes.  He is  on anticoagulation.    Continue anti rhythmic / rate control medications

## 2019-07-17 LAB
ALBUMIN SERPL-MCNC: 4 G/DL
ALP BLD-CCNC: 86 U/L
ALT SERPL-CCNC: 13 U/L
ANION GAP SERPL CALCULATED.3IONS-SCNC: NORMAL MMOL/L
AST SERPL-CCNC: 21 U/L
AVERAGE GLUCOSE: NORMAL
BILIRUB SERPL-MCNC: 0.3 MG/DL (ref 0.1–1.4)
BUN BLDV-MCNC: 18 MG/DL
CALCIUM SERPL-MCNC: 9 MG/DL
CHLORIDE BLD-SCNC: 102 MMOL/L
CHOLESTEROL, TOTAL: 107 MG/DL
CHOLESTEROL/HDL RATIO: NORMAL
CO2: 28 MMOL/L
CREAT SERPL-MCNC: 1.2 MG/DL
GFR CALCULATED: NORMAL
GLUCOSE BLD-MCNC: 159 MG/DL
HBA1C MFR BLD: 7.6 %
HDLC SERPL-MCNC: 48 MG/DL (ref 35–70)
LDL CHOLESTEROL CALCULATED: 44 MG/DL (ref 0–160)
POTASSIUM SERPL-SCNC: 4.7 MMOL/L
SODIUM BLD-SCNC: 142 MMOL/L
TOTAL PROTEIN: 6.1
TRIGL SERPL-MCNC: 77 MG/DL
VLDLC SERPL CALC-MCNC: 15 MG/DL

## 2019-08-12 ENCOUNTER — PROCEDURE VISIT (OUTPATIENT)
Dept: CARDIOLOGY CLINIC | Age: 84
End: 2019-08-12
Payer: MEDICARE

## 2019-08-12 DIAGNOSIS — Z95.0 CARDIAC PACEMAKER IN SITU: Primary | ICD-10-CM

## 2019-08-12 DIAGNOSIS — I49.5 SINUS NODE DYSFUNCTION (HCC): ICD-10-CM

## 2019-08-12 DIAGNOSIS — I44.30 AV BLOCK: ICD-10-CM

## 2019-08-12 PROCEDURE — 93296 REM INTERROG EVL PM/IDS: CPT | Performed by: INTERNAL MEDICINE

## 2019-08-12 PROCEDURE — 93294 REM INTERROG EVL PM/LDLS PM: CPT | Performed by: INTERNAL MEDICINE

## 2019-09-08 ENCOUNTER — HOSPITAL ENCOUNTER (EMERGENCY)
Age: 84
Discharge: HOME | End: 2019-09-08
Payer: MEDICARE

## 2019-09-08 VITALS
TEMPERATURE: 98.4 F | RESPIRATION RATE: 18 BRPM | WEIGHT: 200 LBS | HEART RATE: 59 BPM | HEIGHT: 65 IN | DIASTOLIC BLOOD PRESSURE: 74 MMHG | OXYGEN SATURATION: 97 % | BODY MASS INDEX: 33.32 KG/M2 | SYSTOLIC BLOOD PRESSURE: 109 MMHG

## 2019-09-08 DIAGNOSIS — S39.012A STRAIN OF LUMBAR REGION, INITIAL ENCOUNTER: Primary | ICD-10-CM

## 2019-09-08 PROCEDURE — 6360000002 HC RX W HCPCS: Performed by: PHYSICIAN ASSISTANT

## 2019-09-08 PROCEDURE — 99282 EMERGENCY DEPT VISIT SF MDM: CPT

## 2019-09-08 PROCEDURE — 6370000000 HC RX 637 (ALT 250 FOR IP): Performed by: PHYSICIAN ASSISTANT

## 2019-09-08 PROCEDURE — 96372 THER/PROPH/DIAG INJ SC/IM: CPT

## 2019-09-08 RX ORDER — FENTANYL CITRATE 50 UG/ML
50 INJECTION, SOLUTION INTRAMUSCULAR; INTRAVENOUS ONCE
Status: COMPLETED | OUTPATIENT
Start: 2019-09-08 | End: 2019-09-08

## 2019-09-08 RX ORDER — LIDOCAINE 50 MG/G
1 PATCH TOPICAL DAILY
Qty: 10 PATCH | Refills: 0 | Status: SHIPPED | OUTPATIENT
Start: 2019-09-08 | End: 2019-09-18

## 2019-09-08 RX ORDER — LIDOCAINE 4 G/G
1 PATCH TOPICAL DAILY
Status: DISCONTINUED | OUTPATIENT
Start: 2019-09-08 | End: 2019-09-08 | Stop reason: HOSPADM

## 2019-09-08 RX ORDER — HYDROCODONE BITARTRATE AND ACETAMINOPHEN 5; 325 MG/1; MG/1
1 TABLET ORAL EVERY 8 HOURS PRN
Qty: 6 TABLET | Refills: 0 | Status: SHIPPED | OUTPATIENT
Start: 2019-09-08 | End: 2019-09-11

## 2019-09-08 RX ADMIN — FENTANYL CITRATE 50 MCG: 50 INJECTION INTRAMUSCULAR; INTRAVENOUS at 02:17

## 2019-09-08 ASSESSMENT — PAIN DESCRIPTION - PAIN TYPE
TYPE: ACUTE PAIN
TYPE: ACUTE PAIN

## 2019-09-08 ASSESSMENT — PAIN DESCRIPTION - LOCATION
LOCATION: BACK
LOCATION: BACK

## 2019-09-08 ASSESSMENT — PAIN SCALES - GENERAL
PAINLEVEL_OUTOF10: 8
PAINLEVEL_OUTOF10: 2
PAINLEVEL_OUTOF10: 8

## 2019-09-08 ASSESSMENT — PAIN DESCRIPTION - ORIENTATION: ORIENTATION: MID

## 2019-09-14 NOTE — ED PROVIDER NOTES
Triage Chief Complaint:   Back Pain    [place]:  Castillo Cormier is a 80 y.o. male that presents today complaining of back pain. Pain is ranked  08/10. No blunt trauma. No saddle anesthesia no bowel or bladder incontinence or retention no musculoskeletal weakness. Context is patient has had gnawing, aching pain that got worse tonight to the point where I cannot sleep. He states pain is fine when he is sitting up but when he layes down he feel stiffness and cannot sleep tonight. Patient admits to no  radiation      ROS:  General:  No fevers, no chills  ENT:  No sore throat, no nasal congestion  Cardiovascular:  No chest pain  Respiratory:  No shortness of breath  Gastrointestinal:  No pain, no nausea, no vomiting, no diarrhea  Musculoskeletal:  No muscle pain, no joint pain, + back pain  Skin:  No rash, no pruritis, no easy bruising  Neurologic:  No speech problems, no headache, no extremity numbness, no extremity tingling, no extremity weakness  Genitourinary:  No dysuria, no hematuria  Endocrine:  No unexpected weight gain, no unexpected weight loss  Extremities:  no edema, no pain    Past Medical History:   Diagnosis Date    Acid reflux     Arthritis     Asthma     \"As A Child, No Problems\"    Back problem     \"Back Spasms Sometimes\"    CAD (coronary artery disease)     Sees Dr. Garnett Essex    Diabetes mellitus [facility]) Dx 1980's    Full dentures     H/O 24 hour EKG monitoring 05/22/2014    14 DAY EVENT MONITOR    H/O Doppler ultrasound 10/25/11    <50% carotid disease bilaterally    H/O echocardiogram 10/11    EF 62%    H/O echocardiogram 11/24/14    Significant for aortic root and left arterial enlargement. Normal LV systolic but abnormal diastolic function. Mild tricuspid and aortic insufficiencies. Minimum pulmonary HTN.     Hiatal hernia     History of cardiovascular stress test     12/13 Normal EF52%, 10/11 moderate inferior wall myocardial ischemia    History of Doppler echocardiogram 11/01/2017    Normal left ventricle structure and systolic function. Ejection fraction is visually estimated at 55-60%. Grade I diastolic dysfunction. Sclerotic, but non-stenotic aortic valve. Mild aortic regurgitation with pressure half time of 656 msec. Mildly elevated pulmonary artery pressure. Dilated aortic root at 4.2 cm. No evidence of pericardial effusion.     History of Holter monitoring 5/22/14    Event Monitor: Paced rhythm with PACs    HX OTHER MEDICAL     Primary Care Physician Is Dr. Zeferino Redd     Resident At [address] [place] [address]     Hypertension     Kidney stones 1980's    \"Passed Kidney Stones\"    Neuropathy     \"Both Feet\"    Nocturia     PA[facility]ox[facility]mal [facility] fibrillation) [facility]     Palpitations 5/14    PVD (peripheral vascular disease) ([facility] [address] 2/11    mild    S/P CABG x 3 1998    Done In [place] S/P left heart catheterization by percutaneous approach 6/28/10    patency of 2 grafts with possile occlusion of third    S/P placement of cardiac pacemaker 8-7-98, 1-7-11    Medtronic Shadya  Pacemaker Implanted    Slow urinary stream     Staph infection \"Twice In 2001\"    \"Left Knee After Surgery\"    Wears glasses      Past Surgical History:   Procedure Laterality Date    CARDIAC PACEMAKER PLACEMENT  8-7-98    Medtronic Hung REEVES Pacemaker Implanted    CARDIAC PACEMAKER PLACEMENT  1-7-11    Medtronic Hung REEVES Pacemaker Implanted   [address]    CABG ([address])    CHOLECYSTECTOMY, LAPAROSCOPIC  1999    COLONOSCOPY  \"Several\"    \"Removed Polyps Once\"    DENTAL SURGERY      All Teeth Extracted In Past    INGUINAL HERNIA REPAIR Left 1940   [facility] Right 1980's    JOINT REPLACEMENT Left 2001    Total Left Knee    JOINT REPLACEMENT Left 2001    \"Had Staph Infection , Took Implant Out, Replaced It After 8 Months\"    OTHER SURGICAL HISTORY Left 12/18/13    left spermatocelectomy, left scrotal exploration    PACEMAKER PLACEMENT  98    Nirav Persaud DR Pacemaker Implanted    PACEMAKER PLACEMENT  11    Nirav Persaud DR Pacemaker Implanted    PACEMAKER PLACEMENT Left 2018    previous device explanted, Medtronic Hung ADD R01 implanted.      TONSILLECTOMY       Family History   Problem Relation Age of Onset   Fanny Larsen Stroke Mother     Diabetes Mother     Cancer Mother         \"Breast Cancer\"    Stroke Father     High Blood Pressure Father     Arthritis Sister     Heart Disease Brother     Other Daughter         \"Grossly Overweight\"    Other Daughter         \"Sleep Apnea\"    Arthritis Son      Social History     Socioeconomic History    Marital status:      Spouse name: Not on file    Number of children: Not on file    Years of education: Not on file    Highest education level: Not on file   Occupational History    Occupation: retired   Social Needs    Financial resource strain: Not on file    Food insecurity:     Worry: Not on file     Inability: Not on file   [employer] needs:     Medical: Not on file     Non-medical: Not on file   Tobacco Use    Smoking status: Former Smoker     Packs/day: 1.00     Years: 35.00     Pack years: 35.00     Start date: 1946     Last attempt to quit: 1981     Years since quittin.7    Smokeless tobacco: Former User     Quit date: 1981   Substance and Sexual Activity    Alcohol use: No     Alcohol/week: 0.0 standard drinks    Drug use: No    Sexual activity: Never     Comment:    Lifestyle    Physical activity:     Days per week: Not on file     Minutes per session: Not on file    Stress: Not on file   Relationships    Social connections:     Talks on phone: Not on file     Gets together: Not on file     Attends Congregation service: Not on file     Active member of club or organization: Not on file     Attends meetings of clubs or organizations: Not on file     Relationship status: Not on file    Intimate partner

## 2019-11-18 ENCOUNTER — PROCEDURE VISIT (OUTPATIENT)
Dept: CARDIOLOGY CLINIC | Age: 84
End: 2019-11-18
Payer: MEDICARE

## 2019-11-18 DIAGNOSIS — I44.30 AV BLOCK: ICD-10-CM

## 2019-11-18 DIAGNOSIS — I49.5 SINUS NODE DYSFUNCTION (HCC): ICD-10-CM

## 2019-11-18 DIAGNOSIS — Z95.0 CARDIAC PACEMAKER IN SITU: Primary | ICD-10-CM

## 2019-11-18 PROCEDURE — 93294 REM INTERROG EVL PM/LDLS PM: CPT | Performed by: INTERNAL MEDICINE

## 2019-11-18 PROCEDURE — 93296 REM INTERROG EVL PM/IDS: CPT | Performed by: INTERNAL MEDICINE

## 2019-12-11 ENCOUNTER — OFFICE VISIT (OUTPATIENT)
Dept: CARDIOLOGY CLINIC | Age: 84
End: 2019-12-11
Payer: MEDICARE

## 2019-12-11 VITALS
BODY MASS INDEX: 33.42 KG/M2 | HEIGHT: 65 IN | DIASTOLIC BLOOD PRESSURE: 70 MMHG | HEART RATE: 64 BPM | WEIGHT: 200.6 LBS | SYSTOLIC BLOOD PRESSURE: 122 MMHG

## 2019-12-11 DIAGNOSIS — Z95.0 S/P PLACEMENT OF CARDIAC PACEMAKER: Primary | ICD-10-CM

## 2019-12-11 PROCEDURE — G8417 CALC BMI ABV UP PARAM F/U: HCPCS | Performed by: NURSE PRACTITIONER

## 2019-12-11 PROCEDURE — G8598 ASA/ANTIPLAT THER USED: HCPCS | Performed by: NURSE PRACTITIONER

## 2019-12-11 PROCEDURE — 1123F ACP DISCUSS/DSCN MKR DOCD: CPT | Performed by: NURSE PRACTITIONER

## 2019-12-11 PROCEDURE — 4040F PNEUMOC VAC/ADMIN/RCVD: CPT | Performed by: NURSE PRACTITIONER

## 2019-12-11 PROCEDURE — G8427 DOCREV CUR MEDS BY ELIG CLIN: HCPCS | Performed by: NURSE PRACTITIONER

## 2019-12-11 PROCEDURE — 1036F TOBACCO NON-USER: CPT | Performed by: NURSE PRACTITIONER

## 2019-12-11 PROCEDURE — G8484 FLU IMMUNIZE NO ADMIN: HCPCS | Performed by: NURSE PRACTITIONER

## 2019-12-11 PROCEDURE — 99212 OFFICE O/P EST SF 10 MIN: CPT | Performed by: NURSE PRACTITIONER

## 2019-12-20 ENCOUNTER — TELEPHONE (OUTPATIENT)
Dept: CARDIOLOGY CLINIC | Age: 84
End: 2019-12-20

## 2019-12-31 ENCOUNTER — HOSPITAL ENCOUNTER (OUTPATIENT)
Age: 84
Setting detail: OBSERVATION
Discharge: HOME | End: 2019-12-31
Attending: EMERGENCY MEDICINE | Admitting: HOSPITALIST
Payer: MEDICARE

## 2019-12-31 ENCOUNTER — APPOINTMENT (OUTPATIENT)
Dept: GENERAL RADIOLOGY | Age: 84
End: 2019-12-31
Payer: MEDICARE

## 2019-12-31 ENCOUNTER — APPOINTMENT (OUTPATIENT)
Dept: NUCLEAR MEDICINE | Age: 84
End: 2019-12-31
Payer: MEDICARE

## 2019-12-31 VITALS
SYSTOLIC BLOOD PRESSURE: 128 MMHG | HEART RATE: 60 BPM | BODY MASS INDEX: 31.66 KG/M2 | DIASTOLIC BLOOD PRESSURE: 52 MMHG | HEIGHT: 66 IN | WEIGHT: 197 LBS | RESPIRATION RATE: 18 BRPM | OXYGEN SATURATION: 99 % | TEMPERATURE: 97.6 F

## 2019-12-31 PROBLEM — R07.9 CHEST PAIN: Status: ACTIVE | Noted: 2019-12-31

## 2019-12-31 LAB
ALBUMIN SERPL-MCNC: 3.5 GM/DL (ref 3.4–5)
ALP BLD-CCNC: 127 IU/L (ref 40–129)
ALT SERPL-CCNC: 11 U/L (ref 10–40)
ANION GAP SERPL CALCULATED.3IONS-SCNC: 9 MMOL/L (ref 4–16)
AST SERPL-CCNC: 20 IU/L (ref 15–37)
BASOPHILS ABSOLUTE: 0 K/CU MM
BASOPHILS RELATIVE PERCENT: 0.7 % (ref 0–1)
BILIRUB SERPL-MCNC: 0.4 MG/DL (ref 0–1)
BUN BLDV-MCNC: 25 MG/DL (ref 6–23)
CALCIUM SERPL-MCNC: 8.8 MG/DL (ref 8.3–10.6)
CHLORIDE BLD-SCNC: 99 MMOL/L (ref 99–110)
CO2: 30 MMOL/L (ref 21–32)
CREAT SERPL-MCNC: 1 MG/DL (ref 0.9–1.3)
DIFFERENTIAL TYPE: ABNORMAL
EOSINOPHILS ABSOLUTE: 0.1 K/CU MM
EOSINOPHILS RELATIVE PERCENT: 2.6 % (ref 0–3)
ESTIMATED AVERAGE GLUCOSE: 212 MG/DL
FOLATE: >20 NG/ML (ref 3.1–17.5)
GFR AFRICAN AMERICAN: >60 ML/MIN/1.73M2
GFR NON-AFRICAN AMERICAN: >60 ML/MIN/1.73M2
GLUCOSE BLD-MCNC: 168 MG/DL (ref 70–99)
GLUCOSE BLD-MCNC: 248 MG/DL (ref 70–99)
GLUCOSE BLD-MCNC: 250 MG/DL (ref 70–99)
HBA1C MFR BLD: 9 % (ref 4.2–6.3)
HCT VFR BLD CALC: 33.5 % (ref 42–52)
HEMOGLOBIN: 10.7 GM/DL (ref 13.5–18)
IMMATURE NEUTROPHIL %: 0.7 % (ref 0–0.43)
LV EF: 43 %
LV EF: 54 %
LVEF MODALITY: NORMAL
LVEF MODALITY: NORMAL
LYMPHOCYTES ABSOLUTE: 1.1 K/CU MM
LYMPHOCYTES RELATIVE PERCENT: 23 % (ref 24–44)
MCH RBC QN AUTO: 32.3 PG (ref 27–31)
MCHC RBC AUTO-ENTMCNC: 31.9 % (ref 32–36)
MCV RBC AUTO: 101.2 FL (ref 78–100)
MONOCYTES ABSOLUTE: 0.4 K/CU MM
MONOCYTES RELATIVE PERCENT: 9 % (ref 0–4)
NUCLEATED RBC %: 0 %
PDW BLD-RTO: 14.1 % (ref 11.7–14.9)
PLATELET # BLD: 130 K/CU MM (ref 140–440)
PMV BLD AUTO: 9 FL (ref 7.5–11.1)
POTASSIUM SERPL-SCNC: 3.9 MMOL/L (ref 3.5–5.1)
PRO-BNP: 518.4 PG/ML
RBC # BLD: 3.31 M/CU MM (ref 4.6–6.2)
SEGMENTED NEUTROPHILS ABSOLUTE COUNT: 2.9 K/CU MM
SEGMENTED NEUTROPHILS RELATIVE PERCENT: 64 % (ref 36–66)
SODIUM BLD-SCNC: 138 MMOL/L (ref 135–145)
TOTAL IMMATURE NEUTOROPHIL: 0.03 K/CU MM
TOTAL NUCLEATED RBC: 0 K/CU MM
TOTAL PROTEIN: 6.3 GM/DL (ref 6.4–8.2)
TROPONIN T: <0.01 NG/ML
TSH HIGH SENSITIVITY: 1.62 UIU/ML (ref 0.27–4.2)
VITAMIN B-12: 530.4 PG/ML (ref 211–911)
WBC # BLD: 4.6 K/CU MM (ref 4–10.5)

## 2019-12-31 PROCEDURE — A9500 TC99M SESTAMIBI: HCPCS | Performed by: INTERNAL MEDICINE

## 2019-12-31 PROCEDURE — 93005 ELECTROCARDIOGRAM TRACING: CPT | Performed by: INTERNAL MEDICINE

## 2019-12-31 PROCEDURE — G0378 HOSPITAL OBSERVATION PER HR: HCPCS

## 2019-12-31 PROCEDURE — 99220 PR INITIAL OBSERVATION CARE/DAY 70 MINUTES: CPT | Performed by: INTERNAL MEDICINE

## 2019-12-31 PROCEDURE — 82607 VITAMIN B-12: CPT

## 2019-12-31 PROCEDURE — 84443 ASSAY THYROID STIM HORMONE: CPT

## 2019-12-31 PROCEDURE — 71045 X-RAY EXAM CHEST 1 VIEW: CPT

## 2019-12-31 PROCEDURE — 82746 ASSAY OF FOLIC ACID SERUM: CPT

## 2019-12-31 PROCEDURE — 93005 ELECTROCARDIOGRAM TRACING: CPT | Performed by: EMERGENCY MEDICINE

## 2019-12-31 PROCEDURE — 99285 EMERGENCY DEPT VISIT HI MDM: CPT

## 2019-12-31 PROCEDURE — 3430000000 HC RX DIAGNOSTIC RADIOPHARMACEUTICAL: Performed by: INTERNAL MEDICINE

## 2019-12-31 PROCEDURE — 93010 ELECTROCARDIOGRAM REPORT: CPT | Performed by: INTERNAL MEDICINE

## 2019-12-31 PROCEDURE — 6360000002 HC RX W HCPCS: Performed by: INTERNAL MEDICINE

## 2019-12-31 PROCEDURE — 2580000003 HC RX 258: Performed by: HOSPITALIST

## 2019-12-31 PROCEDURE — 93017 CV STRESS TEST TRACING ONLY: CPT

## 2019-12-31 PROCEDURE — 85025 COMPLETE CBC W/AUTO DIFF WBC: CPT

## 2019-12-31 PROCEDURE — 36415 COLL VENOUS BLD VENIPUNCTURE: CPT

## 2019-12-31 PROCEDURE — 78452 HT MUSCLE IMAGE SPECT MULT: CPT

## 2019-12-31 PROCEDURE — 6370000000 HC RX 637 (ALT 250 FOR IP): Performed by: HOSPITALIST

## 2019-12-31 PROCEDURE — 83036 HEMOGLOBIN GLYCOSYLATED A1C: CPT

## 2019-12-31 PROCEDURE — 80053 COMPREHEN METABOLIC PANEL: CPT

## 2019-12-31 PROCEDURE — 84484 ASSAY OF TROPONIN QUANT: CPT

## 2019-12-31 PROCEDURE — 83880 ASSAY OF NATRIURETIC PEPTIDE: CPT

## 2019-12-31 PROCEDURE — 82962 GLUCOSE BLOOD TEST: CPT

## 2019-12-31 PROCEDURE — 93306 TTE W/DOPPLER COMPLETE: CPT

## 2019-12-31 RX ORDER — INSULIN GLARGINE 100 [IU]/ML
20 INJECTION, SOLUTION SUBCUTANEOUS NIGHTLY
Status: DISCONTINUED | OUTPATIENT
Start: 2019-12-31 | End: 2019-12-31 | Stop reason: HOSPADM

## 2019-12-31 RX ORDER — DEXTROSE MONOHYDRATE 50 MG/ML
100 INJECTION, SOLUTION INTRAVENOUS PRN
Status: DISCONTINUED | OUTPATIENT
Start: 2019-12-31 | End: 2019-12-31 | Stop reason: HOSPADM

## 2019-12-31 RX ORDER — LISINOPRIL 2.5 MG/1
2.5 TABLET ORAL DAILY
Status: DISCONTINUED | OUTPATIENT
Start: 2019-12-31 | End: 2019-12-31 | Stop reason: HOSPADM

## 2019-12-31 RX ORDER — ISOSORBIDE MONONITRATE 30 MG/1
30 TABLET, EXTENDED RELEASE ORAL DAILY
Status: DISCONTINUED | OUTPATIENT
Start: 2019-12-31 | End: 2019-12-31 | Stop reason: HOSPADM

## 2019-12-31 RX ORDER — ONDANSETRON 2 MG/ML
4 INJECTION INTRAMUSCULAR; INTRAVENOUS EVERY 6 HOURS PRN
Status: DISCONTINUED | OUTPATIENT
Start: 2019-12-31 | End: 2019-12-31 | Stop reason: HOSPADM

## 2019-12-31 RX ORDER — ASPIRIN 81 MG/1
81 TABLET, CHEWABLE ORAL DAILY
Status: DISCONTINUED | OUTPATIENT
Start: 2020-01-01 | End: 2019-12-31 | Stop reason: HOSPADM

## 2019-12-31 RX ORDER — NICOTINE POLACRILEX 4 MG
15 LOZENGE BUCCAL PRN
Status: DISCONTINUED | OUTPATIENT
Start: 2019-12-31 | End: 2019-12-31 | Stop reason: HOSPADM

## 2019-12-31 RX ORDER — TAMSULOSIN HYDROCHLORIDE 0.4 MG/1
0.4 CAPSULE ORAL DAILY
Status: DISCONTINUED | OUTPATIENT
Start: 2019-12-31 | End: 2019-12-31 | Stop reason: HOSPADM

## 2019-12-31 RX ORDER — SODIUM CHLORIDE 0.9 % (FLUSH) 0.9 %
10 SYRINGE (ML) INJECTION EVERY 12 HOURS SCHEDULED
Status: DISCONTINUED | OUTPATIENT
Start: 2019-12-31 | End: 2019-12-31 | Stop reason: HOSPADM

## 2019-12-31 RX ORDER — CILOSTAZOL 100 MG/1
100 TABLET ORAL 2 TIMES DAILY
Status: DISCONTINUED | OUTPATIENT
Start: 2019-12-31 | End: 2019-12-31 | Stop reason: HOSPADM

## 2019-12-31 RX ORDER — DEXTROSE MONOHYDRATE 25 G/50ML
12.5 INJECTION, SOLUTION INTRAVENOUS PRN
Status: DISCONTINUED | OUTPATIENT
Start: 2019-12-31 | End: 2019-12-31 | Stop reason: HOSPADM

## 2019-12-31 RX ORDER — ISOSORBIDE MONONITRATE 30 MG/1
30 TABLET, EXTENDED RELEASE ORAL DAILY
Qty: 30 TABLET | Refills: 0 | Status: SHIPPED | OUTPATIENT
Start: 2019-12-31

## 2019-12-31 RX ORDER — FINASTERIDE 5 MG/1
5 TABLET, FILM COATED ORAL DAILY
Status: DISCONTINUED | OUTPATIENT
Start: 2019-12-31 | End: 2019-12-31 | Stop reason: HOSPADM

## 2019-12-31 RX ORDER — SIMVASTATIN 20 MG
20 TABLET ORAL NIGHTLY
Status: DISCONTINUED | OUTPATIENT
Start: 2019-12-31 | End: 2019-12-31 | Stop reason: HOSPADM

## 2019-12-31 RX ORDER — METOPROLOL TARTRATE 50 MG/1
50 TABLET, FILM COATED ORAL 2 TIMES DAILY
Qty: 60 TABLET | Refills: 0 | Status: SHIPPED | OUTPATIENT
Start: 2019-12-31

## 2019-12-31 RX ORDER — METOPROLOL TARTRATE 50 MG/1
50 TABLET, FILM COATED ORAL 2 TIMES DAILY
Status: DISCONTINUED | OUTPATIENT
Start: 2019-12-31 | End: 2019-12-31 | Stop reason: HOSPADM

## 2019-12-31 RX ORDER — LISINOPRIL 2.5 MG/1
2.5 TABLET ORAL DAILY
Qty: 30 TABLET | Refills: 0 | Status: SHIPPED | OUTPATIENT
Start: 2020-01-01

## 2019-12-31 RX ORDER — ASPIRIN 81 MG/1
81 TABLET, CHEWABLE ORAL DAILY
Qty: 30 TABLET | Refills: 0 | Status: SHIPPED | OUTPATIENT
Start: 2020-01-01

## 2019-12-31 RX ORDER — SODIUM CHLORIDE 0.9 % (FLUSH) 0.9 %
10 SYRINGE (ML) INJECTION PRN
Status: DISCONTINUED | OUTPATIENT
Start: 2019-12-31 | End: 2019-12-31 | Stop reason: HOSPADM

## 2019-12-31 RX ORDER — GABAPENTIN 300 MG/1
300 CAPSULE ORAL NIGHTLY
Status: DISCONTINUED | OUTPATIENT
Start: 2019-12-31 | End: 2019-12-31 | Stop reason: HOSPADM

## 2019-12-31 RX ADMIN — LISINOPRIL 2.5 MG: 2.5 TABLET ORAL at 09:28

## 2019-12-31 RX ADMIN — TAMSULOSIN HYDROCHLORIDE 0.4 MG: 0.4 CAPSULE ORAL at 09:28

## 2019-12-31 RX ADMIN — Medication 30 MILLICURIE: at 13:13

## 2019-12-31 RX ADMIN — FINASTERIDE 5 MG: 5 TABLET, FILM COATED ORAL at 09:28

## 2019-12-31 RX ADMIN — CILOSTAZOL 100 MG: 100 TABLET ORAL at 09:28

## 2019-12-31 RX ADMIN — METOPROLOL TARTRATE 50 MG: 50 TABLET, FILM COATED ORAL at 09:28

## 2019-12-31 RX ADMIN — APIXABAN 2.5 MG: 2.5 TABLET, FILM COATED ORAL at 09:27

## 2019-12-31 RX ADMIN — SODIUM CHLORIDE, PRESERVATIVE FREE 10 ML: 5 INJECTION INTRAVENOUS at 09:29

## 2019-12-31 RX ADMIN — REGADENOSON 0.4 MG: 0.08 INJECTION, SOLUTION INTRAVENOUS at 12:02

## 2019-12-31 RX ADMIN — Medication 10 MILLICURIE: at 13:13

## 2019-12-31 NOTE — CONSULTS
joint replacement (Left, 2001); joint replacement (Left, 2001); pacemaker placement (8-7-98); pacemaker placement (1-7-11); Cardiac surgery (1998); Cardiac pacemaker placement (8-7-98); Cardiac pacemaker placement (1-7-11); Colonoscopy (\"Several\"); Dental surgery; Tonsillectomy (1930's); other surgical history (Left, 12/18/13); and pacemaker placement (Left, 12/19/2018). Social History:   reports that he quit smoking about 39 years ago. He started smoking about 73 years ago. He has a 35.00 pack-year smoking history. He quit smokeless tobacco use about 38 years ago. He reports that he does not drink alcohol or use drugs.   Family history:   no family history of CAD, STROKE of DM at early age    Allergies   Allergen Reactions    Pcn [Penicillins] Hives    Terramycin [Oxytetracycline] Hives    Tetracyclines & Related Hives       simvastatin (ZOCOR) tablet 20 mg, Nightly  tamsulosin (FLOMAX) capsule 0.4 mg, Daily  metoprolol tartrate (LOPRESSOR) tablet 50 mg, BID  lisinopril (PRINIVIL;ZESTRIL) tablet 2.5 mg, Daily  insulin glargine (LANTUS) injection vial 20 Units, Nightly  finasteride (PROSCAR) tablet 5 mg, Daily  gabapentin (NEURONTIN) capsule 300 mg, Nightly  cilostazol (PLETAL) tablet 100 mg, BID  apixaban (ELIQUIS) tablet 2.5 mg, BID  sodium chloride flush 0.9 % injection 10 mL, 2 times per day  sodium chloride flush 0.9 % injection 10 mL, PRN  magnesium hydroxide (MILK OF MAGNESIA) 400 MG/5ML suspension 30 mL, Daily PRN  ondansetron (ZOFRAN) injection 4 mg, Q6H PRN  [START ON 1/1/2020] aspirin chewable tablet 81 mg, Daily  glucose (GLUTOSE) 40 % oral gel 15 g, PRN  dextrose 50 % IV solution, PRN  glucagon (rDNA) injection 1 mg, PRN  dextrose 5 % solution, PRN  insulin lispro (HUMALOG) injection vial 0-12 Units, TID WC  insulin lispro (HUMALOG) injection vial 0-6 Units, Nightly  technetium sestamibi (CARDIOLITE) injection 10 millicurie, ONCE PRN  technetium sestamibi (CARDIOLITE) injection 30 millicurie, ONCE breath sounds, No respiratory distress, No wheezing, No chest tenderness. ,no use of accessory muscles, crackles Absent   Cardiovascular: (PMI) apex non displaced,no lifts no thrills, ankle swelling Absent  , 1+, s1 and s2 audible,Murmur. Absent , JVD not noted    Abdomen /GI:  Bowel sounds normal, Soft, No tenderness, No masses, No gross visceromegaly   :  No costovertebral angle tenderness   Musculoskeletal:  No edema, no tenderness, no deformities. Back- no tenderness  Integument:  Well hydrated, no rash   Lymphatic:  No lymphadenopathy noted   Neurologic:  Alert & oriented x 3, CN 2-12 normal, normal motor function, normal sensory function, no focal deficits noted           Medical decision making and Data review:    Lab Review   Recent Labs     12/31/19 0202   WBC 4.6   HGB 10.7*   HCT 33.5*   *      Recent Labs     12/31/19 0202      K 3.9   CL 99   CO2 30   BUN 25*   CREATININE 1.0     Recent Labs     12/31/19  0202   AST 20   ALT 11   BILITOT 0.4   ALKPHOS 127     Recent Labs     12/31/19  0203 12/31/19  0713   TROPONINT <0.010 <0.010       Recent Labs     12/31/19 0202   PROBNP 518.4*     Lab Results   Component Value Date    INR 1.31 12/17/2018    PROTIME 14.9 (H) 12/17/2018       EKG: (reviewed by myself)    ECHO:(reviewed by myself)    Chest Xray:(reviewed by myself)  Xr Chest Portable    Result Date: 12/31/2019  EXAMINATION: ONE XRAY VIEW OF THE CHEST 12/31/2019 1:33 am COMPARISON: Chest radiograph dated March 9, 2019 HISTORY: ORDERING SYSTEM PROVIDED HISTORY: chest pain TECHNOLOGIST PROVIDED HISTORY: Reason for exam:->chest pain Reason for Exam: chest pain Acuity: Acute Type of Exam: Initial FINDINGS: Median sternotomy wires are noted. Cardiomegaly is seen. A left-sided intracardiac device is noted. Pulmonary vascular congestion is noted. Cardiomegaly with pulmonary vascular congestion.        All labs, medications and tests reviewed by myself including data  from outside source , patient and available family . Continue all other medications of all above medical condition listed as is. Impression:  Active Problems:    [facility]    S/P CABG x 3    PAF (paroxysmal atrial fibrillation) ([facility])    Chest pain  Resolved Problems:    * No resolved hospital problems. *      Assessment: 80 y. o.year old with PMH of  has a past medical history of Acid reflux, Arthritis, Asthma, Back problem, CAD (coronary artery disease), Diabetes mellitus ([address] Full dentures, H/O 24 hour EKG monitoring, H/O Doppler ultrasound, H/O echocardiogram, H/O echocardiogram, Hiatal hernia, History of cardiovascular stress test, History of Doppler echocardiogram, History of Holter monitoring, HX OTHER MEDICAL, HX OTHER MEDICAL, Hyperlipidemia, Hypertension, Kidney stones, Neuropathy, Nocturia, PAF (paroxysmal atrial fibrillation) ([address] Palpitations, PVD (peripheral vascular disease) ([address] S/P CABG x 3, S/P left heart catheterization by percutaneous approach, S/P placement of cardiac pacemaker, Slow urinary stream, Staph infection, and Wears glasses. Plan and Recommendations:     Chest Pain: serial cardiac enzymes, EKG reviewed, further plan as per enzymes and clinical course  We will get stress test as long as low risk continue conservative management   post pacemaker he is 100% atrial paced small QRS complex 7 years of battery life left  H/O  atrial fibrillation he is on anticoagulation Eliquis  PAD: he is on pletal   HTN: stable, continue present medications   DVT prophylaxis if no contraindication  6. Dyslipidemia: continue statins           Thank you  much for consult and giving us the opportunity in contributing in the care of this patient. Please feel free to call me for any questions.        Daniella Martinez MD, 12/31/2019 11:12 AM

## 2019-12-31 NOTE — DISCHARGE SUMMARY
Patient Number    [id number]         Room Number          3556      Visit Number      [id number]          Height               66 inches      Corporate ID      [id number]           Weight               200 pounds      Accession Number  [id number]                                           NM Technologist      [facility]ing          [facility]   Physician         MD                 Cardiologist         MD      Conclusions      Summary   ECG portion of stress test is negative for ischemia by diagnostic criteria. Normal EF 54 % with normal ventricular contractility. Decreased uptake inferiorly probably due to diaphragmatic artifact. Normal stress test      Recommendation   Add imdur , Medical management      Signatures      ------------------------------------------------------------------   Electronically signed by Alejandro Smith MD (Interpreting   cardiologist) on 12/31/2019 at 17:08   ------------------------------------------------------------------     Procedure  Procedure Type:      Nuclear Stress Test:Pharmacological, Myocardial Perfusion Imaging with   Pharm, NM MYOCARDIAL SPECT REST EXERCISE OR RX     Indications: CAD and Chest pain. Risk Factors      The patient risk factors include:prior CABG;peripheral arterial disease,   hypercholesterolemia, hypertension and diabetes mellitus. Stress Protocols      Resting ECG   PACED; Resting HR:63 bpm  Resting BP:162/76 mmHg     Stress Protocol:Pharmacologic - Lexiscan     Peak HR:63 bpm                               HR/BP product:[id number]   Peak BP:172/72 mmHg   Predicted HR: 128 bpm   % of predicted HR: 49      Exercise duration: 01:01 min   Reason for termination:Completed      ECG Findings   paced      Arrhythmias   No rhythm abnormality. Symptoms   No symptoms with Lexiscan infusion. Stress Interpretation   ECG portion of stress test is negative for ischemia by diagnostic criteria.      Procedure Medications    - Lexiscan I.V. bolus (over 15sec.) 0.4 mg admininstered @ 12/31/2019 12:00. Imaging Protocols      Rest                             Stress      Isotope:Sestamibi 99mTc          Isotope: Sestamibi 99mTc   Isotope dose:8.5 mCi             Isotope dose:33 mCi   Administration route: I.V. Administration route: I.V. Injection Date:12/31/2019 10:40  Injection Date:12/31/2019 12:00   Scan Date:12/31/2019 11:40       Scan Date:12/31/2019 13:00      Technique:        SPECT          Technique:        Gated                                                      SPECT      Procedure Description      Upon patient arrival, the patient is identified using two identifiers and   the physician order is verified. An IV is established and 8-11mCi of 99mTc   Sestamibi is intravenously injected and followed with 10mL 0.9% Normal   Saline flush. A circulation period of 45 minutes occurs prior to resting   SPECT imaging. After imaging is complete the patient is escorted to the   stress lab. The patient is connected to the ECG and blood pressure is   measured. The RN starts the stress portion of the exam and rapidly   intravenously injects Lexiscan (regadenosine) 0.4mg over a period of 10 to15   seconds and follows with 5mL 0.9% Normal Saline flush. Immediately following   the Nuclear Technologist intravenously injects 22-33mCi of 99mTc Sestamibi   and 5mL 0.9% Normal Saline flush. After completion, recovery, and removal of   the IV, the patient rests during the second circulation period of 45   minutes. Final stress SPECT gated imaging is performed. The patient may   return home or to their room after stress imaging. The images are processed   and final charting is completed and sent to the appropriate cardiologist for   interpretation and reporting. Perfusion Interpretation      Normal EF 54 % with normal ventricular contractility. Decreased uptake inferiorly probably due to diaphragmatic artifact. than his age he states yesterday evening he started having some numbness and tingling in his hand gradually he felt that it was itching. He then says that last night he started having some chest pressure which was 4 out of 10 it did not radiate it lasted couple of minutes therefore he came in for further evaluation. He is not short of breath he is quite active. He does have history of pacemaker he normally sees Dr. Mak Caballero but has not seen him for some time now. He is chest pain-free now     Past medical history:    has a past medical history of Acid reflux, Arthritis, Asthma, Back problem, CAD (coronary artery disease), Diabetes mellitus ([facility] [address] Full dentures, H/O 24 hour EKG monitoring, H/O Doppler ultrasound, H/O echocardiogram, H/O echocardiogram, Hiatal hernia, History of cardiovascular stress test, History of Doppler echocardiogram, History of Holter monitoring, HX OTHER MEDICAL, HX OTHER MEDICAL, Hyperlipidemia, Hypertension, Kidney stones, Neuropathy, Nocturia, PAF (paroxysmal atrial fibrillation) ([facility] [address] Palpitations, PVD (peripheral vascular disease) ([facility] [address] S/P CABG x 3, S/P left heart catheterization by percutaneous approach, S/P placement of cardiac pacemaker, Slow urinary stream, Staph infection, and Wears glasses. Past surgical history:   has a past surgical history that includes Inguinal hernia repair (Left, 1940); Inguinal hernia repair (Right, 1980's); Cholecystectomy, laparoscopic (1999); joint replacement (Left, 2001); joint replacement (Left, 2001); pacemaker placement (8-7-98); pacemaker placement (1-7-11); Cardiac surgery (1998); Cardiac pacemaker placement (8-7-98); Cardiac pacemaker placement (1-7-11); Colonoscopy (\"Several\"); Dental surgery; Tonsillectomy (1930's); other surgical history (Left, 12/18/13); and pacemaker placement (Left, 12/19/2018). Social History:   reports that he quit smoking about 39 years ago. He started smoking about 73 years ago.  He has a 35.00 pack-year smoking history. He quit smokeless tobacco use about 38 years ago. He reports that he does not drink alcohol or use drugs.   Family history:   no family history of CAD, STROKE of DM at early age          Allergies   Allergen Reactions    Pcn [Penicillins] Hives    Terramycin [Oxytetracycline] Hives    Tetracyclines & Related Hives           Current Meds Link used for Sign Out Report   simvastatin (ZOCOR) tablet 20 mg, Nightly  tamsulosin (FLOMAX) capsule 0.4 mg, Daily  metoprolol tartrate (LOPRESSOR) tablet 50 mg, BID  lisinopril (PRINIVIL;ZESTRIL) tablet 2.5 mg, Daily  insulin glargine (LANTUS) injection vial 20 Units, Nightly  finasteride (PROSCAR) tablet 5 mg, Daily  gabapentin (NEURONTIN) capsule 300 mg, Nightly  cilostazol (PLETAL) tablet 100 mg, BID  apixaban (ELIQUIS) tablet 2.5 mg, BID  sodium chloride flush 0.9 % injection 10 mL, 2 times per day  sodium chloride flush 0.9 % injection 10 mL, PRN  magnesium hydroxide (MILK OF MAGNESIA) 400 MG/5ML suspension 30 mL, Daily PRN  ondansetron (ZOFRAN) injection 4 mg, Q6H PRN  [START ON 1/1/2020] aspirin chewable tablet 81 mg, Daily  glucose (GLUTOSE) 40 % oral gel 15 g, PRN  dextrose 50 % IV solution, PRN  glucagon (rDNA) injection 1 mg, PRN  dextrose 5 % solution, PRN  insulin lispro (HUMALOG) injection vial 0-12 Units, TID WC  insulin lispro (HUMALOG) injection vial 0-6 Units, Nightly  technetium sestamibi (CARDIOLITE) injection 10 millicurie, ONCE PRN  technetium sestamibi (CARDIOLITE) injection 30 millicurie, ONCE PRN         Current Facility-Administered Medications             Current Facility-Administered Medications   Medication Dose Route Frequency Provider Last Rate Last Dose    simvastatin (ZOCOR) tablet 20 mg  20 mg Oral Nightly Roselyn Crisostomo MD        tamsulosin (FLOMAX) capsule 0.4 mg  0.4 mg Oral Daily Roselyn Crisostomo MD   0.4 mg at 12/31/19 0928    metoprolol tartrate (LOPRESSOR) tablet 50 mg  50 mg Oral BID Roselyn Crisostomo MD   50 mg at 12/31/19 0951    lisinopril (PRINIVIL;ZESTRIL) tablet 2.5 mg  2.5 mg Oral Daily Goldie Jimenez MD   2.5 mg at 12/31/19 8590    insulin glargine (LANTUS) injection vial 20 Units  20 Units Subcutaneous Nightly Goldie Jimenez MD        finasteride (PROSCAR) tablet 5 mg  5 mg Oral Daily Goldie Jimenez MD   5 mg at 12/31/19 0947    gabapentin (NEURONTIN) capsule 300 mg  300 mg Oral Nightly Goldie Jimenez MD        cilostazol (PLETAL) tablet 100 mg  100 mg Oral BID Goldie Jimenez MD   100 mg at 12/31/19 2200    apixaban (ELIQUIS) tablet 2.5 mg  2.5 mg Oral BID Goldie Jimenez MD   2.5 mg at 12/31/19 7384    sodium chloride flush 0.9 % injection 10 mL  10 mL Intravenous 2 times per day Goldie Jimenez MD   10 mL at 12/31/19 1375    sodium chloride flush 0.9 % injection 10 mL  10 mL Intravenous PRN Goldie Jimenez MD        magnesium hydroxide (MILK OF MAGNESIA) 400 MG/5ML suspension 30 mL  30 mL Oral Daily PRN Goldie Jimenez MD        ondansetron (ZOFRAN) injection 4 mg  4 mg Intravenous Q6H PRN MD Kimberlee Cloud [START ON 1/1/2020] aspirin chewable tablet 81 mg  81 mg Oral Daily Goldie Jimenez MD        glucose (GLUTOSE) 40 % oral gel 15 g  15 g Oral PRN Goldie Jimenez MD        dextrose 50 % IV solution  12.5 g Intravenous PRN Goldie Jimenez MD        glucagon (rDNA) injection 1 mg  1 mg Intramuscular PRN Goldie Jimenez MD        dextrose 5 % solution  100 mL/hr Intravenous PRN Goldie Jimenez MD        insulin lispro (HUMALOG) injection vial 0-12 Units  0-12 Units Subcutaneous TID WC Goldie Jimenez MD        insulin lispro (HUMALOG) injection vial 0-6 Units  0-6 Units Subcutaneous Nightly Goldie Jimenez MD        technetium sestamibi (CARDIOLITE) injection 10 millicurie  10 millicurie Intravenous ONCE PRN Estephanie Pereira MD        technetium sestamibi (CARDIOLITE) injection 30 millicurie  30 millicurie Intravenous ONCE PRN Estehpanie Pereira MD             Review of Systems:   · Constitutional: No Fever or Weight Loss   · Eyes: No Decreased Vision  · ENT: No Headaches, Hearing Loss or Vertigo  · Cardiovascular: As per HPI  · Respiratory: As per HPI  · Gastrointestinal: No abdominal pain, appetite loss, blood in stools, constipation, diarrhea or heartburn  · Genitourinary: No dysuria, trouble voiding, or hematuria  · Musculoskeletal:  No gait disturbance, weakness or joint complaints  · Integumentary: No rash or pruritis  · Neurological: No TIA or stroke symptoms  · Psychiatric: No anxiety or depression  · Endocrine: No malaise, fatigue or temperature intolerance  · Hematologic/Lymphatic: No bleeding problems, blood clots or swollen lymph nodes  · Allergic/Immunologic: No nasal congestion or hives  All systems negative except as marked. Physical Examination:    Vitals          Vitals:     12/31/19 0106 12/31/19 0302 12/31/19 0330 12/31/19 0926   BP: (!) 171/69 (!) 163/66 (!) 167/122 139/67   Pulse: 63 60 78 63   Resp: 16 16 29 16   Temp: 97 °F (36.1 °C)   98.4 °F (36.9 °C) 98.6 °F (37 °C)   TempSrc: Oral   Oral Oral   SpO2: 95% 97% 99%     Weight: 200 lb (90.7 kg)   197 lb (89.4 kg)     Height: 5' 6\" (1.676 m)   5' 6\" (1.676 m)              General Appearance:  No distress, conversant     Constitutional:  Well developed, Well nourished, No acute distress, Non-toxic appearance. HENT:  Normocephalic, Atraumatic, Bilateral external ears normal, Oropharynx moist, No oral exudates, Nose normal. Neck- Normal range of motion, No tenderness, Supple, No stridor,no apical-carotid delay  Lymphatics : no palpable lymph nodes  Eyes:  PERRL, EOMI, Conjunctiva normal, No discharge. Respiratory:  Normal breath sounds, No respiratory distress, No wheezing, No chest tenderness. ,no use of accessory muscles, crackles Absent   Cardiovascular: (PMI) apex non displaced,no lifts no thrills, ankle swelling Absent  , 1+, s1 and s2 audible,Murmur. Absent , JVD not noted    Abdomen /GI:  Bowel sounds normal, Soft, No tenderness, No masses, No gross visceromegaly   : <30 minutes.   Signed:  Manolo Monzon MD, [address]  12/31/2019, 5:12 PM

## 2019-12-31 NOTE — ED TRIAGE NOTES
Patient arrival via ems. Per EMS, pt cannot take aspirin d/t home medications. Pt not given nitro d/t SBP of 104 for EMS. Pt reports pain that began in his left hand and then began in his chest over pacemaker site approx 3 hours ago. Patient states pain has resolved at this time.

## 2019-12-31 NOTE — ED PROVIDER NOTES
Triage Chief Complaint:   Chest Pain (left, over pacer site) and Hand Pain (top of hand)    [place]:  Tiana Martinez is a 80 y.o. male that presents with now resolved chest pain. The patient states that he was lying in bed and had the onset of left-sided chest pain over his pacer site that was dull and lasted for about an hour without alleviating or exacerbating factors. He has not had pain like this before in the past.  Denies any associated shortness of breath, lightheadedness, dizziness, palpitations, nausea, vomiting, diaphoresis. Also states that prior to this episode, he was having a faint pain over the dorsal aspect of his left hand that also resolved. Denies any motor or sensory deficits, abdominal pain, fevers or recent illness. ROS:  At least 14 systems reviewed and otherwise acutely negative except as in the [address] Past Medical History:   Diagnosis Date    Acid reflux     Arthritis     Asthma     \"As A Child, No Problems\"    Back problem     \"Back Spasms Sometimes\"    CAD (coronary artery disease)     Sees Dr. Subhash Ham    Diabetes mellitus [facility]) Dx 1980's    Full dentures     H/O 24 hour EKG monitoring 05/22/2014    14 DAY EVENT MONITOR    H/O Doppler ultrasound 10/25/11    <50% carotid disease bilaterally    H/O echocardiogram 10/11    EF 62%    H/O echocardiogram 11/24/14    Significant for aortic root and left arterial enlargement. Normal LV systolic but abnormal diastolic function. Mild tricuspid and aortic insufficiencies. Minimum pulmonary HTN.  Hiatal hernia     History of cardiovascular stress test     12/13 Normal EF52%, 10/11 moderate inferior wall myocardial ischemia    History of Doppler echocardiogram 11/01/2017    Normal left ventricle structure and systolic function. Ejection fraction is visually estimated at 55-60%. Grade I diastolic dysfunction. Sclerotic, but non-stenotic aortic valve. Mild aortic regurgitation with pressure half time of 656 msec. Mildly elevated pulmonary artery pressure. Dilated aortic root at 4.2 cm. No evidence of pericardial effusion.  History of Holter monitoring 5/22/14    Event Monitor: Paced rhythm with PACs    HX OTHER MEDICAL     Primary Care Physician Is Dr. Mary Olmos     Resident At [address] [place] [address]     Hypertension     Kidney stones 1980's    \"Passed Kidney Stones\"    Neuropathy     \"Both Feet\"    Nocturia     [facility]ox[facility] fibrillation[facility])     Palpitations 5/14    PVD (peripheral vascular disease) ([address] 2/11    mild    S/P CABG x 3 1998    Done [place] S/P left heart catheterization by percutaneous approach 6/28/10    patency of 2 grafts with possile occlusion of third    S/P placement of cardiac pacemaker 8-7-98, 1-7-11    Nirav Persaud DR Pacemaker Implanted    Slow urinary stream     Staph infection \"Twice In 2001\"    \"Left Knee After Surgery\"    Wears glasses      Past Surgical History:   Procedure Laterality Date    CARDIAC PACEMAKER PLACEMENT  8-7-98    Nirav Persaud DR Pacemaker Implanted    CARDIAC PACEMAKER PLACEMENT  1-7-11    Nirav Persaud DR Pacemaker Implanted   [place] [id number]    CABG ([address])    CHOLECYSTECTOMY, LAPAROSCOPIC  1999    COLONOSCOPY  \"Several\"    \"Removed Polyps Once\"    DENTAL SURGERY      All Teeth Extracted In Past    INGUINAL HERNIA REPAIR Left 1940   [place] Right 1980's    JOINT REPLACEMENT Left 2001    Total Left Knee    JOINT REPLACEMENT Left 2001    \"Had Staph Infection , Took Implant Out, Replaced It After 8 Months\"    OTHER SURGICAL HISTORY Left 12/18/13    left spermatocelectomy, left scrotal exploration    PACEMAKER PLACEMENT  8-7-98    Nirav Persaud DR Pacemaker Implanted    PACEMAKER PLACEMENT  1-7-11    Nirav Persaud DR Pacemaker Implanted    PACEMAKER PLACEMENT Left 12/19/2018    previous device explanted, Medtronic Adapta ADD R01 implanted.      TONSILLECTOMY  1930's Family History   Problem Relation Age of Onset    Stroke Mother     Diabetes Mother     Cancer Mother         \"Breast Cancer\"    Stroke Father     High Blood Pressure Father     Arthritis Sister     Heart Disease Brother     Other Daughter         \"Grossly Overweight\"    Other Daughter         \"Sleep Apnea\"    Arthritis Son      Social History     Socioeconomic History    Marital status:      Spouse name: Not on file    Number of children: Not on file    Years of education: Not on file    Highest education level: Not on file   Occupational History    Occupation: retired   Social Needs    Financial resource strain: Not on file    Food insecurity:     Worry: Not on file     Inability: Not on file   DaisyBill needs:     Medical: Not on file     Non-medical: Not on file   Tobacco Use    Smoking status: Former Smoker     Packs/day: 1.00     Years: 35.00     Pack years: 35.00     Start date: 1946     Last attempt to quit: 1981     Years since quittin.0    Smokeless tobacco: Former User     Quit date: 1981   Substance and Sexual Activity    Alcohol use: No     Alcohol/week: 0.0 standard drinks    Drug use: No    Sexual activity: Never     Comment:    Lifestyle    Physical activity:     Days per week: Not on file     Minutes per session: Not on file    Stress: Not on file   Relationships    Social connections:     Talks on phone: Not on file     Gets together: Not on file     Attends Protestant service: Not on file     Active member of club or organization: Not on file     Attends meetings of clubs or organizations: Not on file     Relationship status: Not on file    Intimate partner violence:     Fear of current or ex partner: Not on file     Emotionally abused: Not on file     Physically abused: Not on file     Forced sexual activity: Not on file   Other Topics Concern    Not on file   Social History Narrative    Not on file     No current facility-administered medications for this encounter. Current Outpatient Medications   Medication Sig Dispense Refill    finasteride (PROSCAR) 5 MG tablet Take 5 mg by mouth daily      pioglitazone (ACTOS) 30 MG tablet Take 30 mg by mouth daily       metoprolol tartrate (LOPRESSOR) 25 MG tablet Take 1 tablet by mouth 2 times daily (Patient taking differently: Take 50 mg by mouth 2 times daily ) 60 tablet 5    glipiZIDE (GLUCOTROL) 5 MG tablet Take 2 tablets by mouth 2 times daily (before meals) 360 tablet 1    lisinopril (PRINIVIL;ZESTRIL) 5 MG tablet Take 1 tablet by mouth daily (Patient taking differently: Take 2.5 mg by mouth daily ) 90 tablet 1    Insulin Pen Needle (PEN NEEDLES 3/16\") 31G X 5 MM MISC 1 each by Does not apply route daily 100 each 3    gabapentin (NEURONTIN) 300 MG capsule Take 1 capsule by mouth nightly 270 capsule 2    apixaban (ELIQUIS) 2.5 MG TABS tablet Take 1 tablet by mouth 2 times daily 180 tablet 0    cilostazol (PLETAL) 100 MG tablet Take 1 tablet by mouth 2 times daily 180 tablet 2    insulin glargine (LANTUS) 100 UNIT/ML injection vial Inject 20 Units into the skin nightly 10 vial 2    tamsulosin (FLOMAX) 0.4 MG capsule Take 1 capsule by mouth daily 90 capsule 2    simvastatin (ZOCOR) 20 MG tablet Take 1 tablet by mouth nightly 90 tablet 2    albuterol sulfate HFA (PROVENTIL HFA) 108 (90 Base) MCG/ACT inhaler Inhale 2 puffs into the lungs every 4 hours as needed for Wheezing or Shortness of Breath With spacer (and mask if indicated). Thanks.  1 Inhaler 1    Cholecalciferol (VITAMIN D-3) 1000 UNITS CAPS Take 2,000 Units by mouth daily 180 capsule 2     Allergies   Allergen Reactions    Pcn [Penicillins] Hives    Terramycin [Oxytetracycline] Hives    Tetracyclines & Related Hives       Nursing Notes Reviewed    Physical Exam:  ED Triage Vitals [12/31/19 0106]   Enc Vitals Group      BP (!) 171/69      Pulse 63      Resp 16      Temp 97 °F (36.1 °C)      Temp Source Oral      SpO2 95 %      Weight 200 lb (90.7 kg)      Height 5' 6\" (1.676 m)      Head Circumference       Peak Flow       Pain Score       Pain Loc       Pain Edu? Excl. in [address]? GENERAL APPEARANCE: Awake and alert. Cooperative. No acute distress. HEAD: Normocephalic. Atraumatic. EYES: EOM's grossly intact. Sclera anicteric. ENT: Mucous membranes are moist. Tolerates saliva. No trismus. NECK: Supple. Trachea midline. HEART: RRR. Radial pulses 2+. LUNGS: Respirations unlabored. CTAB  ABDOMEN: Soft. Non-tender. No guarding or rebound. EXTREMITIES: No acute deformities. Left hand with no swelling or tenderness to palpation. SKIN: Warm and dry. NEUROLOGICAL: No gross facial drooping. Moves all 4 extremities spontaneously. PSYCHIATRIC: Normal mood.     I have reviewed and interpreted all of the currently available lab results from this visit (if applicable):  Results for orders placed or performed during the hospital encounter of 12/31/19   CBC Auto Differential   Result Value Ref Range    WBC 4.6 4.0 - 10.5 K/CU MM    RBC 3.31 (L) 4.6 - 6.2 M/CU MM    Hemoglobin 10.7 (L) 13.5 - 18.0 GM/DL    Hematocrit 33.5 (L) 42 - 52 %    .2 (H) 78 - 100 FL    MCH 32.3 (H) 27 - 31 PG    MCHC 31.9 (L) 32.0 - 36.0 %    RDW 14.1 11.7 - 14.9 %    Platelets 132 (L) 553 - 440 K/CU MM    MPV 9.0 7.5 - 11.1 FL    Differential Type AUTOMATED DIFFERENTIAL     Segs Relative 64.0 36 - 66 %    Lymphocytes % 23.0 (L) 24 - 44 %    Monocytes % 9.0 (H) 0 - 4 %    Eosinophils % 2.6 0 - 3 %    Basophils % 0.7 0 - 1 %    Segs Absolute 2.9 K/CU MM    Lymphocytes Absolute 1.1 K/CU MM    Monocytes Absolute 0.4 K/CU MM    Eosinophils Absolute 0.1 K/CU MM    Basophils Absolute 0.0 K/CU MM    Nucleated RBC % 0.0 %    Total Nucleated RBC 0.0 K/CU MM    Total Immature Neutrophil 0.03 K/CU MM    Immature Neutrophil % 0.7 (H) 0 - 0.43 %   Comprehensive Metabolic Panel w/ Reflex to MG   Result Value Ref Range    Sodium 138 135 - 145 MMOL/L    Potassium 3.9 3.5 - 5.1 MMOL/L    Chloride 99 99 - 110 mMol/L    CO2 30 21 - 32 MMOL/L    BUN 25 (H) 6 - 23 MG/DL    CREATININE 1.0 0.9 - 1.3 MG/DL    Glucose 250 (H) 70 - 99 MG/DL    Calcium 8.8 8.3 - 10.6 MG/DL    Alb 3.5 3.4 - 5.0 GM/DL    Total Protein 6.3 (L) 6.4 - 8.2 GM/DL    Total Bilirubin 0.4 0.0 - 1.0 MG/DL    ALT 11 10 - 40 U/L    AST 20 15 - 37 IU/L    Alkaline Phosphatase 127 40 - 129 IU/L    GFR Non-African American >60 >60 mL/min/1.73m2    GFR African American >60 >60 mL/min/1.73m2    Anion Gap 9 4 - 16   Troponin   Result Value Ref Range    Troponin T <0.010 <0.01 NG/ML   Brain Natriuretic Peptide   Result Value Ref Range    Pro-.4 (H) <300 PG/ML      Radiographs (if obtained):  [] The following radiograph was interpreted by myself in the absence of a radiologist:  [x] Radiologist's Report Reviewed:    EKG (if obtained): (All EKG's are interpreted by myself in the absence of a cardiologist)  12 lead EKG as interpreted by me reveals a paced rhythm. Axis is normal. There are no ischemic ST elevations or other suspicious ST changes;  QRS interval is 194, QT interval is not prolonged. Final interpretation: Paced rhythm. MDM:  Plan of care is discussed thoroughly with the patient and family if present. If performed, all imaging and lab work also discussed with patient. All relevant prior results and chart reviewed if available. Patient presents as above. Chest pain is resolved at this time. He does have extensive cardiac history and I am concerned about ACS. Initial EKG is nondiagnostic and initial troponin testing protocols are within normal limits. At the present time, I doubt pulmonary embolus secondary to the lack of tachycardia, tachypnea, or hypoxia.    Similarly, I doubt aortic dissection or abdominal aortic aneurysm secondary to history and description of pain, the patient's nonfocal vascular examination in all four extremities, and CXR unremarkable for signs of mediastinal widening. I also doubt pneumothorax given good bilateral breath sounds and chest x-ray with good lung markings out to the periphery. No signs suggestive of pneumonia on history or physical exam as well. Pericarditis and myocarditis unlikely based on history, EKG findings, and normal cardiac markers. Despite this, I cannot reliably exclude a cardiac etiology here in the emergency department. Therefore, I do feel it is most reasonable to admit the patient for further evaluation, management, and possible provocative testing/further intervention. Patient is agreeable with this plan of care. Regarding hand pain. This has also resolved. He does not have any abnormalities on physical exam and is neurovascularly intact. Clinical Impression:  1.  Acute chest pain      (Please note that portions of this note may have been completed with a voice recognition program. Efforts were made to edit the dictations but occasionally words are mis-transcribed.)    MD Juan Nugent MD  12/31/19 [id number]

## 2019-12-31 NOTE — H&P
file   Yekra needs:     Medical: Not on file     Non-medical: Not on file   Tobacco Use    Smoking status: Former Smoker     Packs/day: 1.00     Years: 35.00     Pack years: 35.00     Start date: 1946     Last attempt to quit: 1981     Years since quittin.0    Smokeless tobacco: Former User     Quit date: 1981   Substance and Sexual Activity    Alcohol use: No     Alcohol/week: 0.0 standard drinks    Drug use: No    Sexual activity: Never     Comment:    Lifestyle    Physical activity:     Days per week: Not on file     Minutes per session: Not on file    Stress: Not on file   Relationships    Social connections:     Talks on phone: Not on file     Gets together: Not on file     Attends Christian service: Not on file     Active member of club or organization: Not on file     Attends meetings of clubs or organizations: Not on file     Relationship status: Not on file    Intimate partner violence:     Fear of current or ex partner: Not on file     Emotionally abused: Not on file     Physically abused: Not on file     Forced sexual activity: Not on file   Other Topics Concern    Not on file   Social History Narrative    Not on file         Family History:   Family History   Problem Relation Age of Onset    Stroke Mother     Diabetes Mother     Cancer Mother         \"Breast Cancer\"    Stroke Father     High Blood Pressure Father     Arthritis Sister     Heart Disease Brother     Other Daughter         \"Grossly Overweight\"    Other Daughter         \"Sleep Apnea\"    Arthritis Son        REVIEW OF SYSTEMS:  CONSTITUTIONAL:  negative  EYES:  negative  HEENT:  negative  RESPIRATORY:  negative  CARDIOVASCULAR:  Chest pain resolved  GASTROINTESTINAL:  negative  ENDOCRINE:  negative  MUSCULOSKELETAL:  negative  NEUROLOGICAL:  negative  BEHAVIOR/PSYCH:  negative  PHYSICAL EXAM:    Vitals:  BP (!) 171/69   Pulse 63   Temp 97 °F (36.1 °C) (Oral)   Resp 16   Ht 5' 6\"

## 2020-01-07 LAB
EKG ATRIAL RATE: 38 BPM
EKG DIAGNOSIS: NORMAL
EKG P-R INTERVAL: 208 MS
EKG Q-T INTERVAL: 522 MS
EKG QRS DURATION: 198 MS
EKG QTC CALCULATION (BAZETT): 529 MS
EKG R AXIS: 132 DEGREES
EKG T AXIS: 77 DEGREES
EKG VENTRICULAR RATE: 62 BPM

## 2020-02-11 ENCOUNTER — OFFICE VISIT (OUTPATIENT)
Dept: CARDIOLOGY CLINIC | Age: 85
End: 2020-02-11
Payer: MEDICARE

## 2020-02-11 VITALS
HEIGHT: 66 IN | SYSTOLIC BLOOD PRESSURE: 102 MMHG | DIASTOLIC BLOOD PRESSURE: 70 MMHG | WEIGHT: 206.2 LBS | HEART RATE: 74 BPM | BODY MASS INDEX: 33.14 KG/M2

## 2020-02-11 PROCEDURE — G8484 FLU IMMUNIZE NO ADMIN: HCPCS | Performed by: INTERNAL MEDICINE

## 2020-02-11 PROCEDURE — 99214 OFFICE O/P EST MOD 30 MIN: CPT | Performed by: INTERNAL MEDICINE

## 2020-02-11 PROCEDURE — 1123F ACP DISCUSS/DSCN MKR DOCD: CPT | Performed by: INTERNAL MEDICINE

## 2020-02-11 PROCEDURE — 1036F TOBACCO NON-USER: CPT | Performed by: INTERNAL MEDICINE

## 2020-02-11 PROCEDURE — 4040F PNEUMOC VAC/ADMIN/RCVD: CPT | Performed by: INTERNAL MEDICINE

## 2020-02-11 PROCEDURE — G8417 CALC BMI ABV UP PARAM F/U: HCPCS | Performed by: INTERNAL MEDICINE

## 2020-02-11 PROCEDURE — G8427 DOCREV CUR MEDS BY ELIG CLIN: HCPCS | Performed by: INTERNAL MEDICINE

## 2020-02-11 NOTE — LETTER
CLINICAL STAFF DOCUMENTATION    Jimbo Pozo  3/24/1927  [id number]    Have you had any Chest Pain - No      Have you had any Shortness of Breath - No      Have you had any dizziness - No      Have you had any palpitations - No          Do you have any edema - swelling in legs            Do you have a surgery or procedure scheduled in the near future - No

## 2020-02-11 NOTE — PROGRESS NOTES
Take 1 tablet by mouth daily 30 tablet 0    metoprolol tartrate (LOPRESSOR) 50 MG tablet Take 1 tablet by mouth 2 times daily 60 tablet 0    finasteride (PROSCAR) 5 MG tablet Take 5 mg by mouth daily      pioglitazone (ACTOS) 30 MG tablet Take 30 mg by mouth daily       glipiZIDE (GLUCOTROL) 5 MG tablet Take 2 tablets by mouth 2 times daily (before meals) 360 tablet 1    Insulin Pen Needle (PEN NEEDLES 3/16\") 31G X 5 MM MISC 1 each by Does not apply route daily 100 each 3    gabapentin (NEURONTIN) 300 MG capsule Take 1 capsule by mouth nightly 270 capsule 2    apixaban (ELIQUIS) 2.5 MG TABS tablet Take 1 tablet by mouth 2 times daily 180 tablet 0    cilostazol (PLETAL) 100 MG tablet Take 1 tablet by mouth 2 times daily 180 tablet 2    insulin glargine (LANTUS) 100 UNIT/ML injection vial Inject 20 Units into the skin nightly 10 vial 2    tamsulosin (FLOMAX) 0.4 MG capsule Take 1 capsule by mouth daily 90 capsule 2    Cholecalciferol (VITAMIN D-3) 1000 UNITS CAPS Take 2,000 Units by mouth daily 180 capsule 2    simvastatin (ZOCOR) 20 MG tablet Take 1 tablet by mouth nightly 90 tablet 2     No current facility-administered medications for this visit. Allergies: Pcn [penicillins]; Terramycin [oxytetracycline]; and Tetracyclines & related  Past Medical History:   Diagnosis Date    Acid reflux     Arthritis     Asthma     \"As A Child, No Problems\"    Back problem     \"Back Spasms Sometimes\"    CAD (coronary artery disease)     Sees Dr. Condon Males    Diabetes mellitus [facility]) Dx 1980's    Full dentures     H/O 24 hour EKG monitoring 05/22/2014    14 DAY EVENT MONITOR    H/O Doppler ultrasound 10/25/11    <50% carotid disease bilaterally    H/O echocardiogram 10/11    EF 62%    H/O echocardiogram 11/24/14    Significant for aortic root and left arterial enlargement. Normal LV systolic but abnormal diastolic function. Mild tricuspid and aortic insufficiencies. Minimum pulmonary HTN.     Hiatal hernia     History of cardiovascular stress test     12/13 Normal EF52%, 10/11 moderate inferior wall myocardial ischemia    History of Doppler echocardiogram 11/01/2017    Normal left ventricle structure and systolic function. Ejection fraction is visually estimated at 55-60%. Grade I diastolic dysfunction. Sclerotic, but non-stenotic aortic valve. Mild aortic regurgitation with pressure half time of 656 msec. Mildly elevated pulmonary artery pressure. Dilated aortic root at 4.2 cm. No evidence of pericardial effusion.     History of Holter monitoring 5/22/14    Event Monitor: Paced rhythm with PACs    HX OTHER MEDICAL     Primary Care Physician Is Dr. Keegan Friedman     Resident At [address] [place] [address]     Hypertension     Kidney stones 1980's    \"Passed Kidney Stones\"    Neuropathy     \"Both Feet\"    Nocturia     [facility]     Palpitations 5/14    PVD (peripheral vascular disease) ([facility] [address] 2/11    mild    S/P CABG x 3 1998    Done [place] S/P left heart catheterization by percutaneous approach 6/28/10    patency of 2 grafts with possile occlusion of third    S/P placement of cardiac pacemaker 8-7-98, 1-7-11    Medtronic Hung REEVES Pacemaker Implanted    Slow urinary stream     Staph infection \"Twice In 2001\"    \"Left Knee After Surgery\"    Wears glasses      Past Surgical History:   Procedure Laterality Date    CARDIAC PACEMAKER PLACEMENT  8-7-98    Nirav Persaud DR Pacemaker Implanted    CARDIAC PACEMAKER PLACEMENT  1-7-11    Medtronic Hung REEVES Pacemaker Implanted   [facility]    CABG ([address])    CHOLECYSTECTOMY, LAPAROSCOPIC  1999    COLONOSCOPY  \"Several\"    \"Removed Polyps Once\"    DENTAL SURGERY      All Teeth Extracted In Past    INGUINAL HERNIA REPAIR Left 1940    INGUINAL HERNIA REPAIR Right 1980's    JOINT REPLACEMENT Left 2001    Total Left Knee    JOINT REPLACEMENT Left 2001    \"Had Staph Infection , Took Implant Out, Replaced It After 8 Months\"    OTHER SURGICAL HISTORY Left 13    left spermatocelectomy, left scrotal exploration    PACEMAKER PLACEMENT  98    Nirav Persaud DR Pacemaker Implanted    PACEMAKER PLACEMENT  11    Nirav Persaud DR Pacemaker Implanted    PACEMAKER PLACEMENT Left 2018    previous device explanted, Medtronic Hung ADD R01 implanted.  TONSILLECTOMY  1930's      As reviewed   Family History   Problem Relation Age of Onset   Jerry Lam Stroke Mother     Diabetes Mother     Cancer Mother         \"Breast Cancer\"    Stroke Father     High Blood Pressure Father     Arthritis Sister     Heart Disease Brother     Other Daughter         \"Grossly Overweight\"    Other Daughter         \"Sleep Apnea\"    Arthritis Son      Social History     Tobacco Use    Smoking status: Former Smoker     Packs/day: 1.00     Years: 35.00     Pack years: 35.00     Start date: 1946     Last attempt to quit: 1981     Years since quittin.1    Smokeless tobacco: Former User     Quit date: 1981   Substance Use Topics    Alcohol use: No     Alcohol/week: 0.0 standard drinks      Review of Systems:    Constitutional: Negative for diaphoresis and fatigue  Psychological:Negative for anxiety or depression  HENT: Negative for headaches, nasal congestion, sinus pain or vertigo  Eyes: Negative for visual disturbance.    Endocrine: Negative for polydipsia/polyuria  Respiratory: Negative for shortness of breath  Cardiovascular: Negative for chest pain, dyspnea on exertion, claudication, edema, irregular heartbeat, murmur, palpitations or shortness of breath  Gastrointestinal: Negative for abdominal pain or heartburn  Genito-Urinary: Negative for urinary frequency/urgency  Musculoskeletal: Negative for muscle pain, muscular weakness, negative for pain in arm and leg or swelling in foot and leg  Neurological: Negative for dizziness, headaches, memory loss, numbness/tingling, visual changes, syncope  Dermatological: Negative for rash    Objective:  /70   Pulse 74   Ht 5' 6\" (1.676 m)   Wt 206 lb 3.2 oz (93.5 kg)   BMI 33.28 kg/m²   Wt Readings from Last 3 Encounters:   02/11/20 206 lb 3.2 oz (93.5 kg)   12/31/19 197 lb (89.4 kg)   12/11/19 200 lb 9.6 oz (91 kg)     Body mass index is 33.28 kg/m². GENERAL - Alert, oriented, pleasant, in no apparent distress. EYES: No jaundice, no conjunctival pallor. SKIN: It is warm & dry. No rashes. No Echhymosis    HEENT - No clinically significant abnormalities seen. Neck - Supple. No jugular venous distention noted. No carotid bruits. Cardiovascular - Normal S1 and S2 without obvious murmur or gallop. Extremities - No cyanosis, clubbing, or significant edema. Pulmonary - No respiratory distress. No wheezes or rales. Abdomen - No masses, tenderness, or organomegaly. Musculoskeletal - No significant edema. No joint deformities. No muscle wasting. Neurologic - Cranial nerves II through XII are grossly intact. There were no gross focal neurologic abnormalities.     Lab Review   Lab Results   Component Value Date    TROPONINT <0.010 12/31/2019    TROPONINT <0.010 12/31/2019     BNP:    Lab Results   Component Value Date    BNP 28 05/16/2014     PT/INR:    Lab Results   Component Value Date    INR 1.31 12/17/2018    INR 1.53 07/01/2018     Lab Results   Component Value Date    LABA1C 9.0 (H) 12/31/2019    LABA1C 7.6 07/17/2019     Lab Results   Component Value Date    WBC 4.6 12/31/2019    WBC 5.6 05/24/2019    HCT 33.5 (L) 12/31/2019    HCT 33.7 (L) 05/24/2019    .2 (H) 12/31/2019    .7 (H) 05/24/2019     (L) 12/31/2019     (L) 05/24/2019     Lab Results   Component Value Date    CHOL 107 07/17/2019    CHOL 105 10/07/2016    TRIG 77 07/17/2019    TRIG 66 10/07/2016    HDL 48 07/17/2019    HDL 49 10/07/2016    LDLCALC 44 07/17/2019    LDLCALC 43 10/07/2016    LDLDIRECT 49  Other specified disorder of male genital organs(608.89) N50.89    Palpitations R00.2    S/P placement of cardiac pacemaker Z95.0    PAF [facility]oxysmal atrial fibrillation) ([facility]) I48.0    Coronary artery disease involving coronary bypass graft of native heart without angina pectoris I25.810    Benign prostatic hyperplasia without lower urinary tract symptoms N40.0    Dizziness R42    PAD (peripheral artery disease) ([facility]) I73.9    Pacemaker at end of battery life Z45.010    Chest pain R07.9       Assessment & Plan:    CAD:Yes   clinically stable. Patient is on optimal medical regimen ( see medication list above )  -     CORONARY ARTERY DISEASE: Patient is currently  asymptomatic from CAD. - changes in  treatment:   no           - Testing ordered:  no  [facility] classification: 1  [place] RISK SCORE:  [place] RISK SCORE:  HTN:well controlled on current medical regimen, see list above. - changes in  treatment:   no   CARDIOMYOPATHY: None known   CONGESTIVE HEART FAILURE: NO KNOWN HISTORY.     VHD: No significant VHD noted  DYSLIPIDEMIA: Patient's profile is at / near [employer],                                                               Tolerating current medical regimen wellyes,                                                              See most recent Lab values in Labs section above. Diabetes mellitis: .yes,                                      Managed by family MD                                     BS under good control no                                     Hgb A1c avilable yes,   OTHER RELEVANT DIAGNOSIS:as noted in patient's active problem list: S/P PPM.  TESTS ORDERED: None this visit                                  All previously ordered tests reviewed. ARRHYTHMIAS:  known                                Patient has H/O P. Atrial fibrillation                                He is rate controlled & on anticoagulation. MEDICATIONS: CPM   Office f/u in six months.  Device check per protocol. Primary/secondary prevention is the goal by aggressive risk modification, healthy and therapeutic life style changes for cardiovascular risk reduction. Various goals are discussed and multiple questions answered.

## 2020-02-24 ENCOUNTER — PROCEDURE VISIT (OUTPATIENT)
Dept: CARDIOLOGY CLINIC | Age: 85
End: 2020-02-24
Payer: MEDICARE

## 2020-02-24 ENCOUNTER — TELEPHONE (OUTPATIENT)
Dept: CARDIOLOGY CLINIC | Age: 85
End: 2020-02-24

## 2020-02-24 PROCEDURE — 93294 REM INTERROG EVL PM/LDLS PM: CPT | Performed by: INTERNAL MEDICINE

## 2020-02-24 PROCEDURE — 93296 REM INTERROG EVL PM/IDS: CPT | Performed by: INTERNAL MEDICINE

## 2020-02-27 ENCOUNTER — HOSPITAL ENCOUNTER (EMERGENCY)
Age: 85
Discharge: HOME | End: 2020-02-27
Payer: MEDICARE

## 2020-02-27 VITALS
DIASTOLIC BLOOD PRESSURE: 64 MMHG | OXYGEN SATURATION: 95 % | BODY MASS INDEX: 33.32 KG/M2 | SYSTOLIC BLOOD PRESSURE: 158 MMHG | HEIGHT: 65 IN | WEIGHT: 200 LBS | TEMPERATURE: 98.5 F | HEART RATE: 59 BPM | RESPIRATION RATE: 16 BRPM

## 2020-02-27 LAB
ALBUMIN SERPL-MCNC: 3.7 GM/DL (ref 3.4–5)
ALP BLD-CCNC: 73 IU/L (ref 40–129)
ALT SERPL-CCNC: 10 U/L (ref 10–40)
ANION GAP SERPL CALCULATED.3IONS-SCNC: 12 MMOL/L (ref 4–16)
AST SERPL-CCNC: 19 IU/L (ref 15–37)
BACTERIA: NEGATIVE /HPF
BASOPHILS ABSOLUTE: 0 K/CU MM
BASOPHILS RELATIVE PERCENT: 0.6 % (ref 0–1)
BILIRUB SERPL-MCNC: 0.7 MG/DL (ref 0–1)
BILIRUBIN URINE: NEGATIVE MG/DL
BLOOD, URINE: ABNORMAL
BUN BLDV-MCNC: 13 MG/DL (ref 6–23)
CALCIUM SERPL-MCNC: 8.9 MG/DL (ref 8.3–10.6)
CHLORIDE BLD-SCNC: 102 MMOL/L (ref 99–110)
CLARITY: CLEAR
CO2: 28 MMOL/L (ref 21–32)
COLOR: ABNORMAL
CREAT SERPL-MCNC: 0.9 MG/DL (ref 0.9–1.3)
DIFFERENTIAL TYPE: ABNORMAL
EOSINOPHILS ABSOLUTE: 0.1 K/CU MM
EOSINOPHILS RELATIVE PERCENT: 3 % (ref 0–3)
GFR AFRICAN AMERICAN: >60 ML/MIN/1.73M2
GFR NON-AFRICAN AMERICAN: >60 ML/MIN/1.73M2
GLUCOSE BLD-MCNC: 157 MG/DL (ref 70–99)
GLUCOSE, URINE: NEGATIVE MG/DL
HCT VFR BLD CALC: 33.2 % (ref 42–52)
HEMOGLOBIN: 10.7 GM/DL (ref 13.5–18)
IMMATURE NEUTROPHIL %: 0.2 % (ref 0–0.43)
KETONES, URINE: NEGATIVE MG/DL
LEUKOCYTE ESTERASE, URINE: NEGATIVE
LYMPHOCYTES ABSOLUTE: 0.8 K/CU MM
LYMPHOCYTES RELATIVE PERCENT: 17 % (ref 24–44)
MCH RBC QN AUTO: 33.4 PG (ref 27–31)
MCHC RBC AUTO-ENTMCNC: 32.2 % (ref 32–36)
MCV RBC AUTO: 103.8 FL (ref 78–100)
MONOCYTES ABSOLUTE: 0.5 K/CU MM
MONOCYTES RELATIVE PERCENT: 9.9 % (ref 0–4)
NITRITE URINE, QUANTITATIVE: NEGATIVE
NUCLEATED RBC %: 0 %
PDW BLD-RTO: 14.3 % (ref 11.7–14.9)
PH, URINE: 5 (ref 5–8)
PLATELET # BLD: 121 K/CU MM (ref 140–440)
PMV BLD AUTO: 9 FL (ref 7.5–11.1)
POTASSIUM SERPL-SCNC: 4.1 MMOL/L (ref 3.5–5.1)
PROTEIN UA: NEGATIVE MG/DL
RBC # BLD: 3.2 M/CU MM (ref 4.6–6.2)
RBC URINE: <1 /HPF (ref 0–3)
SEGMENTED NEUTROPHILS ABSOLUTE COUNT: 3.2 K/CU MM
SEGMENTED NEUTROPHILS RELATIVE PERCENT: 69.3 % (ref 36–66)
SODIUM BLD-SCNC: 142 MMOL/L (ref 135–145)
SPECIFIC GRAVITY UA: 1.01 (ref 1–1.03)
TOTAL IMMATURE NEUTOROPHIL: 0.01 K/CU MM
TOTAL NUCLEATED RBC: 0 K/CU MM
TOTAL PROTEIN: 6.5 GM/DL (ref 6.4–8.2)
TRICHOMONAS: ABNORMAL /HPF
UROBILINOGEN, URINE: NORMAL MG/DL (ref 0.2–1)
WBC # BLD: 4.7 K/CU MM (ref 4–10.5)
WBC UA: <1 /HPF (ref 0–2)

## 2020-02-27 PROCEDURE — 99283 EMERGENCY DEPT VISIT LOW MDM: CPT

## 2020-02-27 PROCEDURE — 87086 URINE CULTURE/COLONY COUNT: CPT

## 2020-02-27 PROCEDURE — 80053 COMPREHEN METABOLIC PANEL: CPT

## 2020-02-27 PROCEDURE — 4500000027

## 2020-02-27 PROCEDURE — 85025 COMPLETE CBC W/AUTO DIFF WBC: CPT

## 2020-02-27 PROCEDURE — 81001 URINALYSIS AUTO W/SCOPE: CPT

## 2020-02-27 NOTE — ED TRIAGE NOTES
Patient arrived in the ED via stretcher by EMS with c/o frequent urination. Denies any burning or urgency. He stated \"I just keep going every 15 to 20 min. \"

## 2020-02-27 NOTE — ED PROVIDER NOTES
[place]      PCP: Brice Walker MD    [address] [facility]    Chief Complaint   Patient presents with    Urinary Frequency       This patient was not evaluated by the attending physician. I have independently evaluated this patient. HPI    Daryl Pavon is a 80 y.o. male who presents with urinary frequency. Onset -yesterday evening  Duration -patient feels as though he needs to urinate every 15 or 20 minutes  Aggravating/Alleviating factors -none  Associate symptoms -urinary urgency    Patient denies pain, dysuria, hematuria, penile pain or discharge, testicular pain or swelling, concern for STIs, abdominal pain. REVIEW OF SYSTEMS    Constitutional:  Denies fever, chills  HENT:  Denies sore throat or ear pain   Cardiovascular:  Denies chest pain, palpitations or swelling   Respiratory:  Denies cough or shortness of breath   GI:  Denies abdominal pain, nausea, emesis, diarrhea, constipation, melena, hematochezia  : See HPI  Musculoskeletal:  Denies back pain or groin pain or masses. No pain or swelling of extremities. Skin:  Denies rash  Neurologic:  Denies headache, focal weakness or sensory changes   Endocrine:  Denies polyuria or polydypsia   Lymphatic:  Denies swollen glands     All other review of systems are negative  See HPI and nursing notes for additional information     PAST MEDICAL & SURGICAL HISTORY    Past Medical History:   Diagnosis Date    Acid reflux     Arthritis     Asthma     \"As A Child, No Problems\"    Back problem     \"Back Spasms Sometimes\"    CAD (coronary artery disease)     Sees Dr. Braxton Ra    Diabetes mellitus [facility]) Dx 1980's    Full dentures     H/O 24 hour EKG monitoring 05/22/2014    14 DAY EVENT MONITOR    H/O Doppler ultrasound 10/25/11    <50% carotid disease bilaterally    H/O echocardiogram 10/11    EF 62%    H/O echocardiogram 11/24/14    Significant for aortic root and left arterial enlargement.  Normal LV systolic but abnormal PHYSICAL EXAM    VITAL SIGNS: BP (!) 158/64   Pulse 59   Temp 98.5 °F (36.9 °C) (Oral)   Resp 16   Ht 5' 5\" (1.651 m)   Wt 200 lb (90.7 kg)   SpO2 95%   BMI 33.28 kg/m²   Constitutional:  Well developed, well nourished. No distress  Eyes:  Sclera nonicteric, conjunctiva moist  HENT:  Atraumatic. PERRL. EOMI. Moist mucus membranes. Neck/Lymphatics: supple, no JVD, no swollen nodes  Respiratory:  No retractions, no accessory muscle use, normal breath sounds   Cardiovascular:  normal rate, normal rhythm, no murmurs    :  Patient declines  examination    GI:    No gross discoloration.       -no [place]'s sign (periumbilical ecchymosis)       -no Grey-Mg's sign (flank ecchymosis)    Bowel sounds present, no audible bruits. Soft,  no distention, no guarding, no rigidity,   No abdominal tenderness, no rebound tenderness, no palpable pulsatile masses,   No McBurney's point tenderness   Negative Rovsing sign    Negative Bear's sign. Back:   No CVA tenderness to percussion.   Musculoskeletal:  No edema, no deformity  Vascular: DP pulses 2+ equal bilaterally  Integument: No rash, dry skin  Neurologic:  Alert & oriented, normal speech  Psychiatric: Cooperative, pleasant affect       LABS:  Results for orders placed or performed during the hospital encounter of 02/27/20   CBC Auto Differential   Result Value Ref Range    WBC 4.7 4.0 - 10.5 K/CU MM    RBC 3.20 (L) 4.6 - 6.2 M/CU MM    Hemoglobin 10.7 (L) 13.5 - 18.0 GM/DL    Hematocrit 33.2 (L) 42 - 52 %    .8 (H) 78 - 100 FL    MCH 33.4 (H) 27 - 31 PG    MCHC 32.2 32.0 - 36.0 %    RDW 14.3 11.7 - 14.9 %    Platelets 449 (L) 837 - 440 K/CU MM    MPV 9.0 7.5 - 11.1 FL    Differential Type AUTOMATED DIFFERENTIAL     Segs Relative 69.3 (H) 36 - 66 %    Lymphocytes % 17.0 (L) 24 - 44 %    Monocytes % 9.9 (H) 0 - 4 %    Eosinophils % 3.0 0 - 3 %    Basophils % 0.6 0 - 1 %    Segs Absolute 3.2 K/CU MM    Lymphocytes Absolute 0.8 K/CU MM    Monocytes Absolute 0.5 K/CU MM    Eosinophils Absolute 0.1 K/CU MM    Basophils Absolute 0.0 K/CU MM    Nucleated RBC % 0.0 %    Total Nucleated RBC 0.0 K/CU MM    Total Immature Neutrophil 0.01 K/CU MM    Immature Neutrophil % 0.2 0 - 0.43 %   Comprehensive Metabolic Panel w/ Reflex to MG   Result Value Ref Range    Sodium 142 135 - 145 MMOL/L    Potassium 4.1 3.5 - 5.1 MMOL/L    Chloride 102 99 - 110 mMol/L    CO2 28 21 - 32 MMOL/L    BUN 13 6 - 23 MG/DL    CREATININE 0.9 0.9 - 1.3 MG/DL    Glucose 157 (H) 70 - 99 MG/DL    Calcium 8.9 8.3 - 10.6 MG/DL    Alb 3.7 3.4 - 5.0 GM/DL    Total Protein 6.5 6.4 - 8.2 GM/DL    Total Bilirubin 0.7 0.0 - 1.0 MG/DL    ALT 10 10 - 40 U/L    AST 19 15 - 37 IU/L    Alkaline Phosphatase 73 40 - 129 IU/L    GFR Non-African American >60 >60 mL/min/1.73m2    GFR African American >60 >60 mL/min/1.73m2    Anion Gap 12 4 - 16   Urinalysis Reflex to Culture   Result Value Ref Range    Color, UA STRAW (A) YELLOW    Clarity, UA CLEAR CLEAR    Glucose, Urine NEGATIVE NEGATIVE MG/DL    Bilirubin Urine NEGATIVE NEGATIVE MG/DL    Ketones, Urine NEGATIVE NEGATIVE MG/DL    Specific Gravity, UA 1.008 1.001 - 1.035    Blood, Urine SMALL (A) NEGATIVE    pH, Urine 5.0 5.0 - 8.0    Protein, UA NEGATIVE NEGATIVE MG/DL    Urobilinogen, Urine NORMAL 0.2 - 1.0 MG/DL    Nitrite Urine, Quantitative NEGATIVE NEGATIVE    Leukocyte Esterase, Urine NEGATIVE NEGATIVE    RBC, UA <1 0 - 3 /HPF    WBC, UA <1 0 - 2 /HPF    Bacteria, UA NEGATIVE NEGATIVE /HPF    Trichomonas, UA NONE SEEN NONE SEEN /HPF     Urine culture in process    ED COURSE & MEDICAL DECISION MAKING       Vital signs and nursing notes reviewed during ED course. I have independently evaluated this patient. Supervising physician present in the Emergency Department, available for consultation, throughout entirety of  patient care. Patient presents as above which prompted workup. While in the ED today, labs were obtained.  Labs reveal chronic anemia and chronic thrombocytopenia similar to baseline. Urine without evidence of infection. CMP with glucose of 157 but otherwise without derangements. No leukocytosis or leukopenia. Patient has no pain. He declines  examination. Abdominal exam without peritoneal signs. No CVA tenderness. No deep pelvic or rectal pain. Urine culture ordered. Post void residual ultrasound performed and revealed 83 mL, and then patient voided again approximately that same amount right after the ultrasound. Does not appear the patient is having urinary retention. Patient and I discussed possible BPH versus very early prostatitis as cause of symptoms today. Patient reports that he does see urologist, Dr. Jose Cruz Buckner, and was supposed to see them 2 weeks ago but missed his appointment. Patient understands the need to reschedule appointment with urologist for recheck as soon as possible. Patient is nontoxic-appearing. Vital signs are stable. Patient stable for outpatient management. Patient comfortable with discharge home. All pertinent Lab data and radiographic results reviewed with patient at bedside. The patient and/or the family were informed of the results of any tests/labs/imaging, the treatment plan, and time was allotted to answer questions. Diagnosis, disposition, and treatment plan reviewed with patient and/or family who understands and agrees. Patient understands and agrees to follow up with urologist for recheck as soon as possible. Patient understands and agrees to return to the emergency department for any new or worsening symptoms- strict return precautions given. Clinical  IMPRESSION    1.  Urinary frequency        Disposition referral (if applicable):  Rosie Glaser, [address] [facility]  [phone]    Schedule an appointment as soon as possible for a visit   Recheck as soon as possible    [address] [facility] Emergency Department  [address] [facility]

## 2020-02-27 NOTE — ED NOTES
Report received from night shift nurse and care continued by [facility] Michelle Madrid RN  02/27/20 1092

## 2020-02-27 NOTE — ED NOTES
Discharge instructions reviewed with patient. The patient voiced understanding of the discharge paperwork and received no prescriptions. The patient left alert and oriented with no questions or concerns. The patient has a scheduled appointment with his urologist at 2:15 PM today. The [facility] home transport is on their way and will pick the patient up after 9:30 hrs.      Adis Kinney RN  02/27/20 [id number]

## 2020-02-28 LAB
CULTURE: NORMAL
Lab: NORMAL
SPECIMEN: NORMAL

## 2020-05-21 ENCOUNTER — HOSPITAL ENCOUNTER (OUTPATIENT)
Age: 85
Setting detail: SPECIMEN
Discharge: HOME | End: 2020-05-21
Payer: MEDICARE

## 2020-05-21 LAB
ANION GAP SERPL CALCULATED.3IONS-SCNC: 9 MMOL/L (ref 4–16)
BUN BLDV-MCNC: 25 MG/DL (ref 6–23)
CALCIUM SERPL-MCNC: 8.7 MG/DL (ref 8.3–10.6)
CHLORIDE BLD-SCNC: 100 MMOL/L (ref 99–110)
CO2: 29 MMOL/L (ref 21–32)
CREAT SERPL-MCNC: 1 MG/DL (ref 0.9–1.3)
GFR AFRICAN AMERICAN: >60 ML/MIN/1.73M2
GFR NON-AFRICAN AMERICAN: >60 ML/MIN/1.73M2
GLUCOSE BLD-MCNC: 131 MG/DL (ref 70–99)
HCT VFR BLD CALC: 36.1 % (ref 42–52)
HEMOGLOBIN: 11.4 GM/DL (ref 13.5–18)
MCH RBC QN AUTO: 32.9 PG (ref 27–31)
MCHC RBC AUTO-ENTMCNC: 31.6 % (ref 32–36)
MCV RBC AUTO: 104 FL (ref 78–100)
PDW BLD-RTO: 13.7 % (ref 11.7–14.9)
PLATELET # BLD: 132 K/CU MM (ref 140–440)
PMV BLD AUTO: 9.4 FL (ref 7.5–11.1)
POTASSIUM SERPL-SCNC: 3.7 MMOL/L (ref 3.5–5.1)
RBC # BLD: 3.47 M/CU MM (ref 4.6–6.2)
SODIUM BLD-SCNC: 138 MMOL/L (ref 135–145)
WBC # BLD: 5.2 K/CU MM (ref 4–10.5)

## 2020-05-21 PROCEDURE — 85027 COMPLETE CBC AUTOMATED: CPT

## 2020-05-21 PROCEDURE — 36415 COLL VENOUS BLD VENIPUNCTURE: CPT

## 2020-05-21 PROCEDURE — 80048 BASIC METABOLIC PNL TOTAL CA: CPT

## 2020-06-01 ENCOUNTER — PROCEDURE VISIT (OUTPATIENT)
Dept: CARDIOLOGY CLINIC | Age: 85
End: 2020-06-01
Payer: MEDICARE

## 2020-06-01 PROCEDURE — 93294 REM INTERROG EVL PM/LDLS PM: CPT | Performed by: INTERNAL MEDICINE

## 2020-06-01 PROCEDURE — 93296 REM INTERROG EVL PM/IDS: CPT | Performed by: INTERNAL MEDICINE

## 2020-06-05 ENCOUNTER — HOSPITAL ENCOUNTER (EMERGENCY)
Age: 85
Discharge: HOME | End: 2020-06-05
Payer: MEDICARE

## 2020-06-05 ENCOUNTER — APPOINTMENT (OUTPATIENT)
Dept: GENERAL RADIOLOGY | Age: 85
End: 2020-06-05
Payer: MEDICARE

## 2020-06-05 VITALS
DIASTOLIC BLOOD PRESSURE: 63 MMHG | OXYGEN SATURATION: 93 % | HEIGHT: 65 IN | BODY MASS INDEX: 33.32 KG/M2 | WEIGHT: 200 LBS | TEMPERATURE: 98 F | HEART RATE: 65 BPM | RESPIRATION RATE: 18 BRPM | SYSTOLIC BLOOD PRESSURE: 95 MMHG

## 2020-06-05 PROCEDURE — 99283 EMERGENCY DEPT VISIT LOW MDM: CPT

## 2020-06-05 PROCEDURE — 6370000000 HC RX 637 (ALT 250 FOR IP): Performed by: PHYSICIAN ASSISTANT

## 2020-06-05 PROCEDURE — 73080 X-RAY EXAM OF ELBOW: CPT

## 2020-06-05 RX ORDER — NAPROXEN 250 MG/1
250 TABLET ORAL 2 TIMES DAILY WITH MEALS
Qty: 20 TABLET | Refills: 0 | Status: SHIPPED | OUTPATIENT
Start: 2020-06-05 | End: 2020-06-15

## 2020-06-05 RX ORDER — ARM BRACE
1 EACH MISCELLANEOUS PRN
Qty: 1 EACH | Refills: 0 | Status: SHIPPED | OUTPATIENT
Start: 2020-06-05

## 2020-06-05 RX ORDER — HYDROCODONE BITARTRATE AND ACETAMINOPHEN 5; 325 MG/1; MG/1
1 TABLET ORAL EVERY 6 HOURS PRN
Qty: 15 TABLET | Refills: 0 | Status: SHIPPED | OUTPATIENT
Start: 2020-06-05 | End: 2020-06-12

## 2020-06-05 RX ORDER — HYDROCODONE BITARTRATE AND ACETAMINOPHEN 5; 325 MG/1; MG/1
1 TABLET ORAL ONCE
Status: COMPLETED | OUTPATIENT
Start: 2020-06-05 | End: 2020-06-05

## 2020-06-05 RX ORDER — NAPROXEN 250 MG/1
250 TABLET ORAL ONCE
Status: COMPLETED | OUTPATIENT
Start: 2020-06-05 | End: 2020-06-05

## 2020-06-05 RX ADMIN — NAPROXEN 250 MG: 250 TABLET ORAL at 09:55

## 2020-06-05 RX ADMIN — HYDROCODONE BITARTRATE AND ACETAMINOPHEN 1 TABLET: 5; 325 TABLET ORAL at 09:56

## 2020-06-05 ASSESSMENT — PAIN DESCRIPTION - LOCATION: LOCATION: ELBOW

## 2020-06-05 ASSESSMENT — PAIN DESCRIPTION - PAIN TYPE: TYPE: ACUTE PAIN

## 2020-06-05 ASSESSMENT — PAIN SCALES - GENERAL
PAINLEVEL_OUTOF10: 8
PAINLEVEL_OUTOF10: 8

## 2020-06-05 ASSESSMENT — PAIN DESCRIPTION - ORIENTATION: ORIENTATION: LEFT

## 2020-06-05 NOTE — ED PROVIDER NOTES
Replaced It After 8 Months\"    OTHER SURGICAL HISTORY Left 12/18/13    left spermatocelectomy, left scrotal exploration    PACEMAKER PLACEMENT  8-7-98    Nirav Persaud DR Pacemaker Implanted    PACEMAKER PLACEMENT  1-7-11    Nirav Persaud DR Pacemaker Implanted    PACEMAKER PLACEMENT Left 12/19/2018    previous device explanted, Medtronic Adaptalma ADD R01 implanted.  TONSILLECTOMY  1930's       CURRENT MEDICATIONS    Current Outpatient Rx   Medication Sig Dispense Refill    Elastic Bandages & Supports (ACE TENNIS ELBOW BRACE) MISC 1 Device by Does not apply route as needed (Elbow pain) 1 each 0    naproxen (NAPROSYN) 250 MG tablet Take 1 tablet by mouth 2 times daily (with meals) for 10 days 20 tablet 0    HYDROcodone-acetaminophen (NORCO) 5-325 MG per tablet Take 1 tablet by mouth every 6 hours as needed for Pain for up to 7 days.  15 tablet 0    aspirin 81 MG chewable tablet Take 1 tablet by mouth daily 30 tablet 0    isosorbide mononitrate (IMDUR) 30 MG extended release tablet Take 1 tablet by mouth daily 30 tablet 0    lisinopril (PRINIVIL;ZESTRIL) 2.5 MG tablet Take 1 tablet by mouth daily 30 tablet 0    metoprolol tartrate (LOPRESSOR) 50 MG tablet Take 1 tablet by mouth 2 times daily 60 tablet 0    finasteride (PROSCAR) 5 MG tablet Take 5 mg by mouth daily      pioglitazone (ACTOS) 30 MG tablet Take 30 mg by mouth daily       glipiZIDE (GLUCOTROL) 5 MG tablet Take 2 tablets by mouth 2 times daily (before meals) 360 tablet 1    Insulin Pen Needle (PEN NEEDLES 3/16\") 31G X 5 MM MISC 1 each by Does not apply route daily 100 each 3    gabapentin (NEURONTIN) 300 MG capsule Take 1 capsule by mouth nightly 270 capsule 2    apixaban (ELIQUIS) 2.5 MG TABS tablet Take 1 tablet by mouth 2 times daily 180 tablet 0    cilostazol (PLETAL) 100 MG tablet Take 1 tablet by mouth 2 times daily 180 tablet 2    insulin glargine (LANTUS) 100 UNIT/ML injection vial Inject 20 Units into the skin nightly 10

## 2020-09-08 ENCOUNTER — PROCEDURE VISIT (OUTPATIENT)
Dept: CARDIOLOGY CLINIC | Age: 85
End: 2020-09-08
Payer: MEDICARE

## 2020-09-08 PROCEDURE — 93294 REM INTERROG EVL PM/LDLS PM: CPT | Performed by: INTERNAL MEDICINE

## 2020-09-08 PROCEDURE — 93296 REM INTERROG EVL PM/IDS: CPT | Performed by: INTERNAL MEDICINE

## 2020-09-23 ENCOUNTER — TELEPHONE (OUTPATIENT)
Dept: CARDIOLOGY CLINIC | Age: 85
End: 2020-09-23

## 2020-09-23 NOTE — TELEPHONE ENCOUNTER
Mailed medical records to [facility]  [address]  [place],  [address]  Ph. [phone]  Fax; [phone]

## 2024-05-28 NOTE — TELEPHONE ENCOUNTER
Cardiac clearance request received from Dr. Reji Perez for Colonoscopy scheduled  4/24/19.  FAX # [id number]
Patient is cleared for surgery/procedure at a moderate risk per ANGEL Hernandez.   Letter completed and faxed
75

## 2025-02-25 NOTE — ED NOTES
Patient to ED for c/c of hypoglycemia. States he checked his bg today and was 72. . States he normally runs 110-120 in the morning. States he felt \"a little nervous\". . Denies any other symptoms.       Kena [facility]  05/24/19 1564 Clear